# Patient Record
Sex: MALE | Race: WHITE | NOT HISPANIC OR LATINO | Employment: FULL TIME
[De-identification: names, ages, dates, MRNs, and addresses within clinical notes are randomized per-mention and may not be internally consistent; named-entity substitution may affect disease eponyms.]

---

## 2017-05-15 ENCOUNTER — RECORDS - HEALTHEAST (OUTPATIENT)
Dept: ADMINISTRATIVE | Facility: OTHER | Age: 37
End: 2017-05-15

## 2017-05-17 ENCOUNTER — RECORDS - HEALTHEAST (OUTPATIENT)
Dept: ADMINISTRATIVE | Facility: OTHER | Age: 37
End: 2017-05-17

## 2017-05-17 ENCOUNTER — COMMUNICATION - HEALTHEAST (OUTPATIENT)
Dept: INTERNAL MEDICINE | Facility: CLINIC | Age: 37
End: 2017-05-17

## 2017-05-17 ENCOUNTER — OFFICE VISIT - HEALTHEAST (OUTPATIENT)
Dept: INTERNAL MEDICINE | Facility: CLINIC | Age: 37
End: 2017-05-17

## 2017-05-17 DIAGNOSIS — R63.4 ABNORMAL WEIGHT LOSS: ICD-10-CM

## 2017-05-17 DIAGNOSIS — Z00.00 ROUTINE GENERAL MEDICAL EXAMINATION AT A HEALTH CARE FACILITY: ICD-10-CM

## 2017-05-17 DIAGNOSIS — G40.909 SEIZURE DISORDER (H): ICD-10-CM

## 2017-05-17 DIAGNOSIS — R07.89 RIGHT-SIDED CHEST WALL PAIN: ICD-10-CM

## 2017-05-17 DIAGNOSIS — R10.31 RIGHT INGUINAL PAIN: ICD-10-CM

## 2017-05-17 LAB
CHOLEST SERPL-MCNC: 216 MG/DL
FASTING STATUS PATIENT QL REPORTED: YES
HDLC SERPL-MCNC: 56 MG/DL
LDLC SERPL CALC-MCNC: 147 MG/DL
TRIGL SERPL-MCNC: 67 MG/DL

## 2017-05-17 ASSESSMENT — MIFFLIN-ST. JEOR: SCORE: 1617.26

## 2017-05-18 LAB — HIV 1+2 AB+HIV1 P24 AG SERPL QL IA: NEGATIVE

## 2017-05-19 ENCOUNTER — COMMUNICATION - HEALTHEAST (OUTPATIENT)
Dept: INTERNAL MEDICINE | Facility: CLINIC | Age: 37
End: 2017-05-19

## 2017-05-22 ENCOUNTER — COMMUNICATION - HEALTHEAST (OUTPATIENT)
Dept: INTERNAL MEDICINE | Facility: CLINIC | Age: 37
End: 2017-05-22

## 2017-06-12 ENCOUNTER — OFFICE VISIT - HEALTHEAST (OUTPATIENT)
Dept: INTERNAL MEDICINE | Facility: CLINIC | Age: 37
End: 2017-06-12

## 2017-06-12 DIAGNOSIS — F43.20 ADJUSTMENT DISORDER: ICD-10-CM

## 2017-06-12 DIAGNOSIS — R63.4 ABNORMAL WEIGHT LOSS: ICD-10-CM

## 2017-06-12 DIAGNOSIS — R07.89 CHEST WALL PAIN: ICD-10-CM

## 2017-06-12 ASSESSMENT — MIFFLIN-ST. JEOR: SCORE: 1617.26

## 2017-07-11 ENCOUNTER — OFFICE VISIT - HEALTHEAST (OUTPATIENT)
Dept: INTERNAL MEDICINE | Facility: CLINIC | Age: 37
End: 2017-07-11

## 2017-07-11 DIAGNOSIS — R07.89 CHEST WALL PAIN: ICD-10-CM

## 2017-07-11 DIAGNOSIS — R63.4 ABNORMAL WEIGHT LOSS: ICD-10-CM

## 2017-07-11 DIAGNOSIS — F43.22 ADJUSTMENT DISORDER WITH ANXIOUS MOOD: ICD-10-CM

## 2017-07-11 DIAGNOSIS — F41.9 ANXIETY: ICD-10-CM

## 2017-07-11 ASSESSMENT — MIFFLIN-ST. JEOR: SCORE: 1644.48

## 2017-07-18 ENCOUNTER — RECORDS - HEALTHEAST (OUTPATIENT)
Dept: ADMINISTRATIVE | Facility: OTHER | Age: 37
End: 2017-07-18

## 2017-07-28 ENCOUNTER — COMMUNICATION - HEALTHEAST (OUTPATIENT)
Dept: INTERNAL MEDICINE | Facility: CLINIC | Age: 37
End: 2017-07-28

## 2017-09-18 ENCOUNTER — OFFICE VISIT - HEALTHEAST (OUTPATIENT)
Dept: INTERNAL MEDICINE | Facility: CLINIC | Age: 37
End: 2017-09-18

## 2017-09-18 ENCOUNTER — RECORDS - HEALTHEAST (OUTPATIENT)
Dept: ADMINISTRATIVE | Facility: OTHER | Age: 37
End: 2017-09-18

## 2017-09-18 DIAGNOSIS — R63.4 ABNORMAL WEIGHT LOSS: ICD-10-CM

## 2017-09-18 DIAGNOSIS — Z23 NEED FOR IMMUNIZATION AGAINST INFLUENZA: ICD-10-CM

## 2017-09-18 ASSESSMENT — MIFFLIN-ST. JEOR: SCORE: 1667.16

## 2017-11-09 ENCOUNTER — RECORDS - HEALTHEAST (OUTPATIENT)
Dept: ADMINISTRATIVE | Facility: OTHER | Age: 37
End: 2017-11-09

## 2017-12-18 ENCOUNTER — OFFICE VISIT - HEALTHEAST (OUTPATIENT)
Dept: INTERNAL MEDICINE | Facility: CLINIC | Age: 37
End: 2017-12-18

## 2017-12-18 DIAGNOSIS — R63.4 ABNORMAL WEIGHT LOSS: ICD-10-CM

## 2017-12-18 DIAGNOSIS — F41.8 SITUATIONAL ANXIETY: ICD-10-CM

## 2017-12-18 DIAGNOSIS — R11.2 NAUSEA VOMITING AND DIARRHEA: ICD-10-CM

## 2017-12-18 DIAGNOSIS — R19.7 NAUSEA VOMITING AND DIARRHEA: ICD-10-CM

## 2017-12-18 ASSESSMENT — MIFFLIN-ST. JEOR: SCORE: 1680.77

## 2018-01-21 DIAGNOSIS — J31.0 OTHER CHRONIC RHINITIS: Primary | ICD-10-CM

## 2018-02-15 DIAGNOSIS — J31.0 OTHER CHRONIC RHINITIS: ICD-10-CM

## 2018-07-06 ENCOUNTER — COMMUNICATION - HEALTHEAST (OUTPATIENT)
Dept: INTERNAL MEDICINE | Facility: CLINIC | Age: 38
End: 2018-07-06

## 2018-11-29 ENCOUNTER — RECORDS - HEALTHEAST (OUTPATIENT)
Dept: ADMINISTRATIVE | Facility: OTHER | Age: 38
End: 2018-11-29

## 2018-11-29 ENCOUNTER — TRANSFERRED RECORDS (OUTPATIENT)
Dept: HEALTH INFORMATION MANAGEMENT | Facility: CLINIC | Age: 38
End: 2018-11-29

## 2019-05-06 ENCOUNTER — OFFICE VISIT - HEALTHEAST (OUTPATIENT)
Dept: INTERNAL MEDICINE | Facility: CLINIC | Age: 39
End: 2019-05-06

## 2019-05-06 DIAGNOSIS — G40.909 SEIZURE DISORDER (H): ICD-10-CM

## 2019-05-06 DIAGNOSIS — R25.3 EYE MUSCLE TWITCHES: ICD-10-CM

## 2019-05-06 DIAGNOSIS — F43.22 ADJUSTMENT DISORDER WITH ANXIOUS MOOD: ICD-10-CM

## 2019-05-06 DIAGNOSIS — R63.4 ABNORMAL WEIGHT LOSS: ICD-10-CM

## 2019-05-06 LAB
ANION GAP SERPL CALCULATED.3IONS-SCNC: 8 MMOL/L (ref 5–18)
BUN SERPL-MCNC: 10 MG/DL (ref 8–22)
CALCIUM SERPL-MCNC: 9.7 MG/DL (ref 8.5–10.5)
CHLORIDE BLD-SCNC: 101 MMOL/L (ref 98–107)
CO2 SERPL-SCNC: 26 MMOL/L (ref 22–31)
CREAT SERPL-MCNC: 0.85 MG/DL (ref 0.7–1.3)
GFR SERPL CREATININE-BSD FRML MDRD: >60 ML/MIN/1.73M2
GLUCOSE BLD-MCNC: 96 MG/DL (ref 70–125)
POTASSIUM BLD-SCNC: 4.6 MMOL/L (ref 3.5–5)
SODIUM SERPL-SCNC: 135 MMOL/L (ref 136–145)

## 2019-05-06 ASSESSMENT — MIFFLIN-ST. JEOR: SCORE: 1707.98

## 2019-05-07 ENCOUNTER — COMMUNICATION - HEALTHEAST (OUTPATIENT)
Dept: INTERNAL MEDICINE | Facility: CLINIC | Age: 39
End: 2019-05-07

## 2019-09-11 ENCOUNTER — COMMUNICATION - HEALTHEAST (OUTPATIENT)
Dept: INTERNAL MEDICINE | Facility: CLINIC | Age: 39
End: 2019-09-11

## 2019-09-11 ENCOUNTER — OFFICE VISIT - HEALTHEAST (OUTPATIENT)
Dept: INTERNAL MEDICINE | Facility: CLINIC | Age: 39
End: 2019-09-11

## 2019-09-11 ENCOUNTER — RECORDS - HEALTHEAST (OUTPATIENT)
Dept: ADMINISTRATIVE | Facility: OTHER | Age: 39
End: 2019-09-11

## 2019-09-11 DIAGNOSIS — L98.9 SKIN LESION: ICD-10-CM

## 2019-09-11 DIAGNOSIS — G40.909 SEIZURE DISORDER (H): ICD-10-CM

## 2019-09-11 DIAGNOSIS — Z00.00 ROUTINE GENERAL MEDICAL EXAMINATION AT A HEALTH CARE FACILITY: ICD-10-CM

## 2019-09-11 LAB
ALBUMIN SERPL-MCNC: 4.1 G/DL (ref 3.5–5)
ALP SERPL-CCNC: 86 U/L (ref 45–120)
ALT SERPL W P-5'-P-CCNC: 12 U/L (ref 0–45)
ANION GAP SERPL CALCULATED.3IONS-SCNC: 10 MMOL/L (ref 5–18)
AST SERPL W P-5'-P-CCNC: 16 U/L (ref 0–40)
BILIRUB SERPL-MCNC: 0.6 MG/DL (ref 0–1)
BUN SERPL-MCNC: 11 MG/DL (ref 8–22)
CALCIUM SERPL-MCNC: 9.4 MG/DL (ref 8.5–10.5)
CHLORIDE BLD-SCNC: 100 MMOL/L (ref 98–107)
CHOLEST SERPL-MCNC: 213 MG/DL
CO2 SERPL-SCNC: 27 MMOL/L (ref 22–31)
CREAT SERPL-MCNC: 0.9 MG/DL (ref 0.7–1.3)
ERYTHROCYTE [DISTWIDTH] IN BLOOD BY AUTOMATED COUNT: 11.5 % (ref 11–14.5)
FASTING STATUS PATIENT QL REPORTED: YES
GFR SERPL CREATININE-BSD FRML MDRD: >60 ML/MIN/1.73M2
GLUCOSE BLD-MCNC: 95 MG/DL (ref 70–125)
HCT VFR BLD AUTO: 50.3 % (ref 40–54)
HDLC SERPL-MCNC: 49 MG/DL
HGB BLD-MCNC: 16.8 G/DL (ref 14–18)
LDLC SERPL CALC-MCNC: 150 MG/DL
MCH RBC QN AUTO: 31.8 PG (ref 27–34)
MCHC RBC AUTO-ENTMCNC: 33.4 G/DL (ref 32–36)
MCV RBC AUTO: 95 FL (ref 80–100)
PLATELET # BLD AUTO: 353 THOU/UL (ref 140–440)
PMV BLD AUTO: 6.9 FL (ref 7–10)
POTASSIUM BLD-SCNC: 4.4 MMOL/L (ref 3.5–5)
PROT SERPL-MCNC: 7.3 G/DL (ref 6–8)
RBC # BLD AUTO: 5.28 MILL/UL (ref 4.4–6.2)
SODIUM SERPL-SCNC: 137 MMOL/L (ref 136–145)
TRIGL SERPL-MCNC: 72 MG/DL
WBC: 5.9 THOU/UL (ref 4–11)

## 2019-09-11 ASSESSMENT — MIFFLIN-ST. JEOR: SCORE: 1694.38

## 2019-10-02 ENCOUNTER — COMMUNICATION - HEALTHEAST (OUTPATIENT)
Dept: INTERNAL MEDICINE | Facility: CLINIC | Age: 39
End: 2019-10-02

## 2019-11-14 ENCOUNTER — TRANSFERRED RECORDS (OUTPATIENT)
Dept: HEALTH INFORMATION MANAGEMENT | Facility: CLINIC | Age: 39
End: 2019-11-14

## 2020-09-15 ENCOUNTER — COMMUNICATION - HEALTHEAST (OUTPATIENT)
Dept: INTERNAL MEDICINE | Facility: CLINIC | Age: 40
End: 2020-09-15

## 2020-09-15 ENCOUNTER — OFFICE VISIT - HEALTHEAST (OUTPATIENT)
Dept: INTERNAL MEDICINE | Facility: CLINIC | Age: 40
End: 2020-09-15

## 2020-09-15 ENCOUNTER — TRANSFERRED RECORDS (OUTPATIENT)
Dept: HEALTH INFORMATION MANAGEMENT | Facility: CLINIC | Age: 40
End: 2020-09-15

## 2020-09-15 DIAGNOSIS — L98.9 SKIN LESION: ICD-10-CM

## 2020-09-15 DIAGNOSIS — G40.909 SEIZURE DISORDER (H): ICD-10-CM

## 2020-09-15 DIAGNOSIS — Z00.00 ROUTINE GENERAL MEDICAL EXAMINATION AT A HEALTH CARE FACILITY: ICD-10-CM

## 2020-09-15 LAB
ALBUMIN SERPL-MCNC: 4.1 G/DL (ref 3.5–5)
ALBUMIN UR-MCNC: NEGATIVE MG/DL
ALP SERPL-CCNC: 80 U/L (ref 45–120)
ALT SERPL W P-5'-P-CCNC: 15 U/L (ref 0–45)
ALT SERPL-CCNC: 15 U/L (ref 0–45)
ANION GAP SERPL CALCULATED.3IONS-SCNC: 6 MMOL/L (ref 5–18)
APPEARANCE UR: CLEAR
AST SERPL W P-5'-P-CCNC: 18 U/L (ref 0–40)
AST SERPL-CCNC: 18 U/L (ref 0–40)
BILIRUB SERPL-MCNC: 0.5 MG/DL (ref 0–1)
BILIRUB UR QL STRIP: NEGATIVE
BUN SERPL-MCNC: 10 MG/DL (ref 8–22)
CALCIUM SERPL-MCNC: 9.7 MG/DL (ref 8.5–10.5)
CHLORIDE BLD-SCNC: 101 MMOL/L (ref 98–107)
CHOLEST SERPL-MCNC: 253 MG/DL
CHOLEST SERPL-MCNC: 253 MG/DL
CO2 SERPL-SCNC: 30 MMOL/L (ref 22–31)
COLOR UR AUTO: YELLOW
CREAT SERPL-MCNC: 0.89 MG/DL (ref 0.7–1.3)
CREAT SERPL-MCNC: 0.89 MG/DL (ref 0.7–1.3)
ERYTHROCYTE [DISTWIDTH] IN BLOOD BY AUTOMATED COUNT: 11.7 % (ref 11–14.5)
FASTING STATUS PATIENT QL REPORTED: YES
GFR SERPL CREATININE-BSD FRML MDRD: >60 ML/MIN/1.73M2
GFR SERPL CREATININE-BSD FRML MDRD: >60 ML/MIN/1.73M2
GLUCOSE BLD-MCNC: 86 MG/DL (ref 70–125)
GLUCOSE SERPL-MCNC: 86 MG/DL (ref 70–125)
GLUCOSE UR STRIP-MCNC: NEGATIVE MG/DL
HCT VFR BLD AUTO: 49.5 % (ref 40–54)
HDLC SERPL-MCNC: 54 MG/DL
HDLC SERPL-MCNC: 54 MG/DL
HGB BLD-MCNC: 16.6 G/DL (ref 14–18)
HGB UR QL STRIP: NEGATIVE
KETONES UR STRIP-MCNC: NEGATIVE MG/DL
LDLC SERPL CALC-MCNC: 187 MG/DL
LDLC SERPL CALC-MCNC: 187 MG/DL
LEUKOCYTE ESTERASE UR QL STRIP: NEGATIVE
LEVETIRACETAM (KEPPRA): 36.7 UG/ML (ref 6–46)
MCH RBC QN AUTO: 31.5 PG (ref 27–34)
MCHC RBC AUTO-ENTMCNC: 33.6 G/DL (ref 32–36)
MCV RBC AUTO: 94 FL (ref 80–100)
NITRATE UR QL: NEGATIVE
PH UR STRIP: 7 [PH] (ref 5–8)
PLATELET # BLD AUTO: 297 THOU/UL (ref 140–440)
PMV BLD AUTO: 6.7 FL (ref 7–10)
POTASSIUM BLD-SCNC: 4.9 MMOL/L (ref 3.5–5)
POTASSIUM SERPL-SCNC: 4.9 MMOL/L (ref 3.5–5)
PROT SERPL-MCNC: 6.9 G/DL (ref 6–8)
RBC # BLD AUTO: 5.28 MILL/UL (ref 4.4–6.2)
SODIUM SERPL-SCNC: 137 MMOL/L (ref 136–145)
SP GR UR STRIP: 1.01 (ref 1–1.03)
TRIGL SERPL-MCNC: 61 MG/DL
TRIGL SERPL-MCNC: 61 MG/DL
UROBILINOGEN UR STRIP-ACNC: NORMAL
WBC: 5.1 THOU/UL (ref 4–11)

## 2020-09-15 ASSESSMENT — MIFFLIN-ST. JEOR: SCORE: 1698.91

## 2020-10-21 ENCOUNTER — RECORDS - HEALTHEAST (OUTPATIENT)
Dept: ADMINISTRATIVE | Facility: OTHER | Age: 40
End: 2020-10-21

## 2020-10-21 ENCOUNTER — OFFICE VISIT (OUTPATIENT)
Dept: NEUROLOGY | Facility: CLINIC | Age: 40
End: 2020-10-21
Payer: COMMERCIAL

## 2020-10-21 VITALS
WEIGHT: 170 LBS | DIASTOLIC BLOOD PRESSURE: 87 MMHG | HEIGHT: 62 IN | SYSTOLIC BLOOD PRESSURE: 147 MMHG | BODY MASS INDEX: 31.28 KG/M2 | HEART RATE: 77 BPM

## 2020-10-21 DIAGNOSIS — G40.309 GENERALIZED CONVULSIVE EPILEPSY (H): Primary | ICD-10-CM

## 2020-10-21 DIAGNOSIS — M20.41 HAMMERTOES OF BOTH FEET: ICD-10-CM

## 2020-10-21 DIAGNOSIS — M20.42 HAMMERTOES OF BOTH FEET: ICD-10-CM

## 2020-10-21 PROCEDURE — 99205 OFFICE O/P NEW HI 60 MIN: CPT | Performed by: PSYCHIATRY & NEUROLOGY

## 2020-10-21 RX ORDER — LEVETIRACETAM 1000 MG/1
1000 TABLET, FILM COATED ORAL 2 TIMES DAILY
Qty: 180 TABLET | Refills: 3 | Status: SHIPPED | OUTPATIENT
Start: 2020-10-21 | End: 2021-11-09

## 2020-10-21 RX ORDER — LEVETIRACETAM 1000 MG/1
1000 TABLET, FILM COATED ORAL 2 TIMES DAILY
COMMUNITY
End: 2020-10-21

## 2020-10-21 ASSESSMENT — MIFFLIN-ST. JEOR: SCORE: 1555.36

## 2020-10-21 NOTE — NURSING NOTE
Chief Complaint   Patient presents with     Seizures     Needs new neurologist [facility] to [facility]- Needs DMV form      Jeny Gusman CMA on 10/21/2020 at 12:36 PM

## 2020-10-21 NOTE — NURSING NOTE
Component      Latest Ref Rng & Units 9/15/2020   Sodium      136 - 145 mmol/L 137   Potassium      3.5 - 5.0 mmol/L 4.9   Chloride      98 - 107 mmol/L 101   CO2      22 - 31 mmol/L 30   Anion Gap, Calculation      5 - 18 mmol/L 6   Glucose      70 - 125 mg/dL 86   BUN      8 - 22 mg/dL 10   Creatinine      0.70 - 1.30 mg/dL 0.89   GFR MDRD Af Amer      >60 mL/min/1.73m2 >60   GFR MDRD Non Af Amer      >60 mL/min/1.73m2 >60   Bilirubin, Total      0.0 - 1.0 mg/dL 0.5   Calcium      8.5 - 10.5 mg/dL 9.7   Protein, Total      6.0 - 8.0 g/dL 6.9   ALBUMIN      3.5 - 5.0 g/dL 4.1   Alkaline Phosphatase      45 - 120 U/L 80   AST      0 - 40 U/L 18   ALT      0 - 45 U/L 15   Color, UA      Colorless, Yellow, Straw, Light Yellow Yellow   Clarity, UA      Clear Clear   Glucose, UA      Negative Negative   Bilirubin, UA      Negative Negative   Ketones, UA      Negative Negative   Specific Gravity, UA      1.005 - 1.030 1.015   Blood, UA      Negative Negative   pH, UA      5.0 - 8.0 7.0   Protein, UA      Negative mg/dL Negative   Urobilinogen, UA      0.2 E.U./dL, 1.0 E.U./dL 0.2 E.U./dL   Nitrite, UA      Negative Negative   Leukocytes, UA      Negative Negative   WBC      4.0 - 11.0 thou/uL 5.1   RBC      4.40 - 6.20 mill/uL 5.28   Hemoglobin      14.0 - 18.0 g/dL 16.6   Hematocrit      40.0 - 54.0 % 49.5   MCV      80 - 100 fL 94   MCH      27.0 - 34.0 pg 31.5   MCHC      32.0 - 36.0 g/dL 33.6   RDW      11.0 - 14.5 % 11.7   Platelets      140 - 440 thou/uL 297   MPV      7.0 - 10.0 fL 6.7 (L)   Cholesterol      <=199 mg/dL 253 (H)   Triglycerides      <=149 mg/dL 61   HDL Cholesterol      >=40 mg/dL 54   LDL Calculated      <=129 mg/dL 187 (H)   Patient Fasting > 8hrs?       Yes   Levetiracetam      6.0 - 46.0 ug/mL 36.7

## 2020-10-21 NOTE — LETTER
10/21/2020         RE: Jean Paul Gregorio  [address] [address]        Dear Colleague,    Thank you for referring your patient, Jean Paul Gregorio, to the [facility] [place]. Please see a copy of my visit note below.    NEUROLOGY CONSULTATION NOTE       [facility] [place]  [address] [place] [address]  Tel: [phone]  Fax: [phone]  [email or web address]     Jean Paul Gregorio,  1980, MRN [id number]  PCP: Pato Cervantes, [phone]  Date: 10/21/2020     ASSESSMENT & PLAN     Diagnosis code: Generalized convulsive epilepsy (H)     Generalized epilepsy  Pleasant 40-year-old gentleman with history of generalized epilepsy that started at the age of 17.  Seizures were triggered by sleep deprivation and alcohol and occasionally he has noticed myoclonic twitches.  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  An attempt to taper him off Keppra 10 or 11 years ago resulted in breakthrough seizures.  I have recommended:    1.  Continue Keppra 1000 mg twice daily.  I refilled his prescription, gave him 90-day supply with 3 refills.  2.  He recently had Keppra level checked at his primary care physician office and was therapeutic at 36.7.  3.  Patient was asked to sign a release form so that we can get copies of his EEG done at [facility].  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  4.  Patient will drop off his DMV form to be filled out  5.  Follow-up will be in 1 year    Thank you again for this referral, please feel free to contact me if you have any questions.    Esequiel Lee MD  [facility]  (Former[facility], [facility]     REASON FOR CONSULTATION Seizures        HISTORY OF  PRESENT ILLNESS     We have been requested by Dr. Cervantes to evaluate Jean Paul Gregorio who is a 40 year old  male for seizures    Patient is a pleasant 40-year-old gentleman history of generalized epilepsy who was referred for evaluation and management of seizure.  According to patient his first seizure was when he was 17 years old.  He was in high school and stayed up late and had some alcohol and afterwards fell down and had a tonic-clonic seizure.  CT of the head at that time was done and was normal.  He was started on anticonvulsants and was doing fine until 10 or 11 years later when they were attempting to decrease the dose of Keppra.  While visiting [place] in front of his friend he had another seizure and her Keppra dose was increased back to 1000 mg twice daily.  Patient does report that sleep deprivation and alcohol intake does tend to increase his seizure risk.  He occasionally notices myoclonic twitches following alcohol consumption and sleep deprivation.  In addition to sleep deprivation, stress he has also noticed traveling tends to bring out his seizures.  Work-up in the past included EEG in October 2008 that reportedly was normal.  MRI brain in 1997 was also normal.  He recently had some lab work that included a therapeutic Keppra level.     PROBLEM LIST   Patient Active Problem List   Diagnosis Code     Generalized convulsive epilepsy (H) G40.309     Lumbar strain S39.012A     Hammertoes of both feet M20.41, M20.42         PAST MEDICAL & SURGICAL HISTORY     Past Medical History:   Patient  has a past medical history of Lyme disease.    Surgical History:  He  has a past surgical history that includes Septoplasty.     SOCIAL HISTORY     Reviewed, and he  reports that he has quit smoking. He has never used smokeless tobacco.     FAMILY HISTORY     Reviewed, and family history includes Hyperlipidemia in his father and mother; Hypertension in his mother.     ALLERGIES     Allergies   Allergen Reactions  "    Pollen Extract      Other reaction(s): Runny Nose         REVIEW OF SYSTEMS     A 12 point review of system was performed and was negative except as outlined in the history of present illness.     HOME MEDICATIONS       Current Outpatient Medications:      cetirizine HCl 10 MG CAPS, Take 1 capsule by mouth, Disp: , Rfl:      KEPPRA 1000 MG tablet, Take 1 tablet (1,000 mg) by mouth 2 times daily, Disp: 180 tablet, Rfl: 3     QVAR 80 MCG/ACT Inhaler, INHALE 1 PUFF TWICE DAILY, Disp: 8.7 g, Rfl: 0      PHYSICAL EXAM     Vital signs  BP (!) 147/87 (BP Location: Right arm, Patient Position: Sitting)   Pulse 77   Ht 1.575 m (5' 2\")   Wt 77.1 kg (170 lb)   BMI 31.09 kg/m      Weight:   170 lbs 0 oz    GENERAL PHYSICAL EXAM: Patient is alert and oriented x 4 in no acute distress. Neck was supple, no carotid bruits, thyromegaly, lymphadenopathy or JVD noted.   NEUROLOGICAL EXAM:  Mental Status  Patient is A&O x 4. Patient recalls 3/3 objects at 5 minutes.  Speech  Speech is clear and fluent with good repetition, comprehension, and naming both for objects and parts of an object. Written and verbal comprehension is intact.  Cranial Nerves  CN II: Visual fields are full to confrontation. Fundoscopic exam is normal with sharp discs and no vascular changes. Venous pulsations are present bilaterally. Pupils are equal and reactive to light.   CN III, IV, VI: EOMI, PERRLA  CN V: Facial sensation is intact to pinprick in all 3 divisions bilaterally. Corneal responses are intact.  CN VII: Face is symmetric with normal eye closure and smile.  CN VII: Hearing is normal to rubbing fingers  CN IX, X: Palate elevates symmetrically. Phonation is normal.  CN XI: Head turning and shoulder shrug are intact  CN XII: Tongue is midline with normal movements and no atrophy.  Motor Exam  Muscle bulk and tone are normal. No pronator drift. Strength is 5/5 bilaterally. No fasciculations noted.  Reflexes  Reflexes are 2+ and symmetric at the " biceps, triceps, knees, and ankles. Plantar responses are flexor.  Sensory Exam  Light touch, pinprick, position sense, and vibration sense are intact bilaterally. No astereognosia, agraphesthesia or extinction to bilateral simultaneous stimulation.  Coordination  Rapid alternating movements and fine finger movements are intact. No dysmetria on FNF and HKS. Romberg negative.  Gait  Posture is normal.Tandem gait is normal. Able to walk on toes and heels.     DIAGNOSTIC STUDIES     PERTINENT RADIOLOGY  Following imaging studies were reviewed:     Reviewed medical records from [facility] of neurology and patient had a EEG in October 2008 that was normal.  MRI of the brain at [facility] in November 1997 was normal.  He also had a CT of the brain in 1997 that was unremarkable.     PERTINENT LABS  Following labs were reviewed:  Component      Latest Ref Rng & Units 9/15/2020   Sodium      136 - 145 mmol/L 137   Potassium      3.5 - 5.0 mmol/L 4.9   Chloride      98 - 107 mmol/L 101   CO2      22 - 31 mmol/L 30   Anion Gap, Calculation      5 - 18 mmol/L 6   Glucose      70 - 125 mg/dL 86   BUN      8 - 22 mg/dL 10   Creatinine      0.70 - 1.30 mg/dL 0.89   GFR MDRD Af Amer      >60 mL/min/1.73m2 >60   GFR MDRD Non Af Amer      >60 mL/min/1.73m2 >60   Bilirubin, Total      0.0 - 1.0 mg/dL 0.5   Calcium      8.5 - 10.5 mg/dL 9.7   Protein, Total      6.0 - 8.0 g/dL 6.9   ALBUMIN      3.5 - 5.0 g/dL 4.1   Alkaline Phosphatase      45 - 120 U/L 80   AST      0 - 40 U/L 18   ALT      0 - 45 U/L 15   Color, UA      Colorless, Yellow, Straw, Light Yellow Yellow   Clarity, UA      Clear Clear   Glucose, UA      Negative Negative   Bilirubin, UA      Negative Negative   Ketones, UA      Negative Negative   Specific Gravity, UA      1.005 - 1.030 1.015   Blood, UA      Negative Negative   pH, UA      5.0 - 8.0 7.0   Protein, UA      Negative mg/dL Negative   Urobilinogen, UA      0.2 E.U./dL, 1.0 E.U./dL 0.2 E.U./dL    Nitrite, UA      Negative Negative   Leukocytes, UA      Negative Negative   WBC      4.0 - 11.0 thou/uL 5.1   RBC      4.40 - 6.20 mill/uL 5.28   Hemoglobin      14.0 - 18.0 g/dL 16.6   Hematocrit      40.0 - 54.0 % 49.5   MCV      80 - 100 fL 94   MCH      27.0 - 34.0 pg 31.5   MCHC      32.0 - 36.0 g/dL 33.6   RDW      11.0 - 14.5 % 11.7   Platelets      140 - 440 thou/uL 297   MPV      7.0 - 10.0 fL 6.7 (L)   Cholesterol      <=199 mg/dL 253 (H)   Triglycerides      <=149 mg/dL 61   HDL Cholesterol      >=40 mg/dL 54   LDL Calculated      <=129 mg/dL 187 (H)   Patient Fasting > 8hrs?       Yes   Levetiracetam      6.0 - 46.0 ug/mL 36.7                   Total time spent for face to face visit, reviewing labs/imaging studies, counseling and coordination of care was: 1 Hour 15 Minutes More than 50% of this time was spent on counseling and coordination of care.      This note was dictated using voice recognition software.  Any grammatical or context distortions are unintentional and inherent to the software.       Again, thank you for allowing me to participate in the care of your patient.        Sincerely,        Esequiel Lee MD

## 2020-10-21 NOTE — PROGRESS NOTES
NEUROLOGY CONSULTATION NOTE       [facility] [place]  [address] [place] [address]  Tel: [phone]  Fax: [phone]  [email or web address]     Jean Paul Gregorio,  1980, MRN [id number]  PCP: Pato Cervantes, [phone]  Date: 10/21/2020     ASSESSMENT & PLAN     Diagnosis code: Generalized convulsive epilepsy (H)     Generalized epilepsy  Pleasant 40-year-old gentleman with history of generalized epilepsy that started at the age of 17.  Seizures were triggered by sleep deprivation and alcohol and occasionally he has noticed myoclonic twitches.  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  An attempt to taper him off Keppra 10 or 11 years ago resulted in breakthrough seizures.  I have recommended:    1.  Continue Keppra 1000 mg twice daily.  I refilled his prescription, gave him 90-day supply with 3 refills.  2.  He recently had Keppra level checked at his primary care physician office and was therapeutic at 36.7.  3.  Patient was asked to sign a release form so that we can get copies of his EEG done at [facility].  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  4.  Patient will drop off his DMV form to be filled out  5.  Follow-up will be in 1 year    Thank you again for this referral, please feel free to contact me if you have any questions.    Esequiel Lee MD  [facility]  ([facility], [facility]     REASON FOR CONSULTATION Seizures        HISTORY OF PRESENT ILLNESS     We have been requested by Dr. Cervantes to evaluate Jean Paul Gregorio who is a 40 year old  male for seizures    Patient is a pleasant 40-year-old gentleman history of generalized epilepsy who was referred for evaluation and management of seizure.   According to patient his first seizure was when he was 17 years old.  He was in high school and stayed up late and had some alcohol and afterwards fell down and had a tonic-clonic seizure.  CT of the head at that time was done and was normal.  He was started on anticonvulsants and was doing fine until 10 or 11 years later when they were attempting to decrease the dose of Keppra.  While visiting [place] in front of his friend he had another seizure and her Keppra dose was increased back to 1000 mg twice daily.  Patient does report that sleep deprivation and alcohol intake does tend to increase his seizure risk.  He occasionally notices myoclonic twitches following alcohol consumption and sleep deprivation.  In addition to sleep deprivation, stress he has also noticed traveling tends to bring out his seizures.  Work-up in the past included EEG in October 2008 that reportedly was normal.  MRI brain in 1997 was also normal.  He recently had some lab work that included a therapeutic Keppra level.     PROBLEM LIST   Patient Active Problem List   Diagnosis Code     Generalized convulsive epilepsy (H) G40.309     Lumbar strain S39.012A     Hammertoes of both feet M20.41, M20.42         PAST MEDICAL & SURGICAL HISTORY     Past Medical History:   Patient  has a past medical history of Lyme disease.    Surgical History:  He  has a past surgical history that includes Septoplasty.     SOCIAL HISTORY     Reviewed, and he  reports that he has quit smoking. He has never used smokeless tobacco.     FAMILY HISTORY     Reviewed, and family history includes Hyperlipidemia in his father and mother; Hypertension in his mother.     ALLERGIES     Allergies   Allergen Reactions     Pollen Extract      Other reaction(s): Runny Nose         REVIEW OF SYSTEMS     A 12 point review of system was performed and was negative except as outlined in the history of present illness.     HOME MEDICATIONS       Current Outpatient Medications:       "cetirizine HCl 10 MG CAPS, Take 1 capsule by mouth, Disp: , Rfl:      KEPPRA 1000 MG tablet, Take 1 tablet (1,000 mg) by mouth 2 times daily, Disp: 180 tablet, Rfl: 3     QVAR 80 MCG/ACT Inhaler, INHALE 1 PUFF TWICE DAILY, Disp: 8.7 g, Rfl: 0      PHYSICAL EXAM     Vital signs  BP (!) 147/87 (BP Location: Right arm, Patient Position: Sitting)   Pulse 77   Ht 1.575 m (5' 2\")   Wt 77.1 kg (170 lb)   BMI 31.09 kg/m      Weight:   170 lbs 0 oz    GENERAL PHYSICAL EXAM: Patient is alert and oriented x 4 in no acute distress. Neck was supple, no carotid bruits, thyromegaly, lymphadenopathy or JVD noted.   NEUROLOGICAL EXAM:  Mental Status  Patient is A&O x 4. Patient recalls 3/3 objects at 5 minutes.  Speech  Speech is clear and fluent with good repetition, comprehension, and naming both for objects and parts of an object. Written and verbal comprehension is intact.  Cranial Nerves  CN II: Visual fields are full to confrontation. Fundoscopic exam is normal with sharp discs and no vascular changes. Venous pulsations are present bilaterally. Pupils are equal and reactive to light.   CN III, IV, VI: EOMI, PERRLA  CN V: Facial sensation is intact to pinprick in all 3 divisions bilaterally. Corneal responses are intact.  CN VII: Face is symmetric with normal eye closure and smile.  CN VII: Hearing is normal to rubbing fingers  CN IX, X: Palate elevates symmetrically. Phonation is normal.  CN XI: Head turning and shoulder shrug are intact  CN XII: Tongue is midline with normal movements and no atrophy.  Motor Exam  Muscle bulk and tone are normal. No pronator drift. Strength is 5/5 bilaterally. No fasciculations noted.  Reflexes  Reflexes are 2+ and symmetric at the biceps, triceps, knees, and ankles. Plantar responses are flexor.  Sensory Exam  Light touch, pinprick, position sense, and vibration sense are intact bilaterally. No astereognosia, agraphesthesia or extinction to bilateral simultaneous " stimulation.  Coordination  Rapid alternating movements and fine finger movements are intact. No dysmetria on FNF and HKS. Romberg negative.  Gait  Posture is normal.Tandem gait is normal. Able to walk on toes and heels.     DIAGNOSTIC STUDIES     PERTINENT RADIOLOGY  Following imaging studies were reviewed:     Reviewed medical records from [facility] of neurology and patient had a EEG in October 2008 that was normal.  MRI of the brain at [facility] in November 1997 was normal.  He also had a CT of the brain in 1997 that was unremarkable.     PERTINENT LABS  Following labs were reviewed:  Component      Latest Ref Rng & Units 9/15/2020   Sodium      136 - 145 mmol/L 137   Potassium      3.5 - 5.0 mmol/L 4.9   Chloride      98 - 107 mmol/L 101   CO2      22 - 31 mmol/L 30   Anion Gap, Calculation      5 - 18 mmol/L 6   Glucose      70 - 125 mg/dL 86   BUN      8 - 22 mg/dL 10   Creatinine      0.70 - 1.30 mg/dL 0.89   GFR MDRD Af Amer      >60 mL/min/1.73m2 >60   GFR MDRD Non Af Amer      >60 mL/min/1.73m2 >60   Bilirubin, Total      0.0 - 1.0 mg/dL 0.5   Calcium      8.5 - 10.5 mg/dL 9.7   Protein, Total      6.0 - 8.0 g/dL 6.9   ALBUMIN      3.5 - 5.0 g/dL 4.1   Alkaline Phosphatase      45 - 120 U/L 80   AST      0 - 40 U/L 18   ALT      0 - 45 U/L 15   Color, UA      Colorless, Yellow, Straw, Light Yellow Yellow   Clarity, UA      Clear Clear   Glucose, UA      Negative Negative   Bilirubin, UA      Negative Negative   Ketones, UA      Negative Negative   Specific Gravity, UA      1.005 - 1.030 1.015   Blood, UA      Negative Negative   pH, UA      5.0 - 8.0 7.0   Protein, UA      Negative mg/dL Negative   Urobilinogen, UA      0.2 E.U./dL, 1.0 E.U./dL 0.2 E.U./dL   Nitrite, UA      Negative Negative   Leukocytes, UA      Negative Negative   WBC      4.0 - 11.0 thou/uL 5.1   RBC      4.40 - 6.20 mill/uL 5.28   Hemoglobin      14.0 - 18.0 g/dL 16.6   Hematocrit      40.0 - 54.0 % 49.5   MCV       80 - 100 fL 94   MCH      27.0 - 34.0 pg 31.5   MCHC      32.0 - 36.0 g/dL 33.6   RDW      11.0 - 14.5 % 11.7   Platelets      140 - 440 thou/uL 297   MPV      7.0 - 10.0 fL 6.7 (L)   Cholesterol      <=199 mg/dL 253 (H)   Triglycerides      <=149 mg/dL 61   HDL Cholesterol      >=40 mg/dL 54   LDL Calculated      <=129 mg/dL 187 (H)   Patient Fasting > 8hrs?       Yes   Levetiracetam      6.0 - 46.0 ug/mL 36.7                   Total time spent for face to face visit, reviewing labs/imaging studies, counseling and coordination of care was: 1 Hour 15 Minutes More than 50% of this time was spent on counseling and coordination of care.      This note was dictated using voice recognition software.  Any grammatical or context distortions are unintentional and inherent to the software.

## 2020-11-25 ENCOUNTER — RECORDS - HEALTHEAST (OUTPATIENT)
Dept: ADMINISTRATIVE | Facility: OTHER | Age: 40
End: 2020-11-25

## 2021-02-20 ENCOUNTER — COMMUNICATION - HEALTHEAST (OUTPATIENT)
Dept: INTERNAL MEDICINE | Facility: CLINIC | Age: 41
End: 2021-02-20

## 2021-05-28 NOTE — PROGRESS NOTES
"  Office Visit - Follow Up   Jean Paul Gregorio   39 y.o. male    Date of Visit: 5/6/2019    Chief Complaint   Patient presents with     Follow-up     Weight     Eye Twitching     Right eye for 1 month        Assessment and Plan   1. Adjustment disorder with anxious mood  Resolved.  Doing well.    2. Abnormal weight loss  Resolved.    3. Seizure disorder (H)  He will see neurologist as planned on a yearly basis.  No seizures since I saw him last.    4. Eye muscle twitches  Likely stress related and I discussed this with him.  Check electrolytes.  I will see him back later in the year for physical.  He seems to be doing quite well at this time.  - Basic Metabolic Panel        Return in about 6 months (around 11/6/2019) for Annual physical.     History of Present Illness   This 39 y.o. old patient comes in today for follow-up.  He reports his been doing well.  Since I saw him last he has got a job.  He is working in [place] [employer].  That is going well.  He notes some intermittent right eye twitching with the muscle around his eye over the last month.  He has noted a little bit of stress at work with this but denies other neurologic symptoms should he does have a history of seizure disorder.  No other concerns.  Looking forward to spending time with his parents [facility].    Review of Systems: A comprehensive review of systems was negative except as noted.     Medications, Allergies and Problem List   Reviewed, reconciled and updated     Physical Exam   General Appearance:   Pleasant gentleman no distress weight is up considerably to 168 pounds.    /82 (Patient Site: Right Arm, Patient Position: Sitting)   Pulse 74   Ht 6' 0.5\" (1.842 m)   Wt 168 lb (76.2 kg)   SpO2 100%   BMI 22.47 kg/m           Additional Information   Current Outpatient Medications   Medication Sig Dispense Refill     KEPPRA 1,000 mg tablet Take 1 tablet by mouth 2 (two) times a day.  11     No current facility-administered medications for " this visit.      Allergies   Allergen Reactions     Tree Pollen-Birch, Standard      Social History     Tobacco Use     Smoking status: Former Smoker     Smokeless tobacco: Current User     Tobacco comment: Stopped 20 years ago   Substance Use Topics     Alcohol use: Yes     Comment: rare     Drug use: No       Review and/or order of clinical lab tests:  Review and/or order of radiology tests:  Review and/or order of medicine tests:  Discussion of test results with performing physician:  Decision to obtain old records and/or obtain history from someone other than the patient:  Review and summarization of old records and/or obtaining history from someone other than the patient and.or discussion of case with another health care provider:  Independent visualization of image, tracing or specimen itself:    Time: total time spent with the patient was 15 minutes of which >50% was spent in counseling and coordination of care     Pato Cervantes MD

## 2021-05-30 VITALS — HEIGHT: 73 IN | WEIGHT: 148 LBS | BODY MASS INDEX: 19.61 KG/M2

## 2021-05-31 VITALS — BODY MASS INDEX: 21.07 KG/M2 | HEIGHT: 73 IN | WEIGHT: 159 LBS

## 2021-05-31 VITALS — HEIGHT: 73 IN | BODY MASS INDEX: 20.41 KG/M2 | WEIGHT: 154 LBS

## 2021-05-31 VITALS — HEIGHT: 73 IN | BODY MASS INDEX: 19.61 KG/M2 | WEIGHT: 148 LBS

## 2021-05-31 VITALS — HEIGHT: 73 IN | WEIGHT: 162 LBS | BODY MASS INDEX: 21.47 KG/M2

## 2021-06-01 NOTE — PROGRESS NOTES
Office Visit - Physical   Jean Paul Gregorio   39 y.o.  male    Date of visit: 9/11/2019  Physician: Pato Cervantes MD     Assessment and Plan   1. Routine general medical examination at a health care facility  Flu shot given today.  Is otherwise up-to-date on his health maintenance and lives a healthy active lifestyle.  Continue same.  - Ambulatory referral to Dermatology  - Lipid Cascade  - Comprehensive Metabolic Panel  - HM2(CBC w/o Differential)    2. Seizure disorder (H)  Continue yearly follow-up with his neurologist.  Labs as below for med monitoring.  - Comprehensive Metabolic Panel  - HM2(CBC w/o Differential)    3. Skin lesion  We will have him meet with dermatology to further evaluate his irregular appearing nevus.  - Ambulatory referral to Dermatology        Return in about 1 year (around 9/11/2020) for Annual physical.     Chief Complaint   Chief Complaint   Patient presents with     Annual Exam     fasting today         Patient Profile   Social History     Social History Narrative     Not on file        Past Medical History   Patient Active Problem List   Diagnosis     Seizure disorder (H)       Past Surgical History  He has a past surgical history that includes Tympanostomy.     History of Present Illness   This 39 y.o. old Jean Paul comes today for follow-up.  He reports his been doing well.  His only issue is that he may be losing his job here at the end of the month.  Apparently it was only a temporary thing and they run out of funding for it.  He had considerable anxiety with weight loss and stress reaction here last year when he lost a job.  He is hoping that is not going to occur at this time.  Otherwise is feeling well.  He is currently not dating or sexually active.  He said no seizures.  He will be seeing a new neurologist this fall as his retired.  No other somatic concerns.  Is been trying to eat healthy.  Exercises regularly.  Enjoys time at his parents [place] place.  Family is all doing  "well.    Review of Systems: A comprehensive review of systems was negative except as noted.     Medications and Allergies   Current Outpatient Medications   Medication Sig Dispense Refill     fluticasone propionate (FLONASE) 50 mcg/actuation nasal spray Apply 1 spray into each nostril daily.       KEPPRA 1,000 mg tablet Take 1 tablet by mouth 2 (two) times a day.  11     No current facility-administered medications for this visit.      Allergies   Allergen Reactions     Tree Pollen-Birch, Standard         Family and Social History   Family History   Problem Relation Age of Onset     Hypertension Mother      Hyperlipidemia Father      No Medical Problems Brother      No Medical Problems Brother         Social History     Tobacco Use     Smoking status: Former Smoker     Smokeless tobacco: Current User     Tobacco comment: Stopped 20 years ago   Substance Use Topics     Alcohol use: Yes     Comment: rare     Drug use: No        Physical Exam   General Appearance:   Pleasant thin gentleman in no distress.  Good spirits.    /72 (Patient Site: Right Arm, Patient Position: Sitting, Cuff Size: Adult Regular)   Pulse 70   Ht 6' 0.5\" (1.842 m)   Wt 165 lb (74.8 kg)   SpO2 95%   BMI 22.07 kg/m      EYES: Eyelids, conjunctiva, and sclera were normal. Pupils were normal. Cornea, iris, and lens were normal bilaterally.  HEAD, EARS, NOSE, MOUTH, AND THROAT: Head and face were normal. Hearing was normal to voice and the ears were normal to external exam. Nose appearance was normal and there was no discharge. Oropharynx was normal.  NECK: Neck appearance was normal. There were no neck masses and the thyroid was not enlarged.  RESPIRATORY: Breathing pattern was normal and the chest moved symmetrically.  Percussion/auscultatory percussion was normal.  Lung sounds were normal and there were no abnormal sounds.  CARDIOVASCULAR: Heart rate and rhythm were normal.  S1 and S2 were normal and there were no extra sounds or " murmurs. Peripheral pulses in arms and legs were normal.  Jugular venous pressure was normal.  There was no peripheral edema.  GASTROINTESTINAL: The abdomen was normal in contour.  Bowel sounds were present.  Percussion detected no organ enlargement or tenderness.  Palpation detected no tenderness, mass, or enlarged organs.   MUSCULOSKELETAL: Skeletal configuration was normal and muscle mass was normal for age. Joint appearance was overall normal.  LYMPHATIC: There were no enlarged nodes.  SKIN/HAIR/NAILS: Skin color was normal.  Sebaceous cyst mid back.  Also a irregular nevus right back.  Hair and nails were normal.  NEUROLOGIC: The patient was alert and oriented to person, place, time, and circumstance. Speech was normal. Cranial nerves were normal. Motor strength was normal for age. The patient was normally coordinated.  PSYCHIATRIC:  Mood and affect were normal and the patient had normal recent and remote memory. The patient's judgment and insight were normal.    ADDITIONAL VITAL SIGNS: none  CHEST WALL/BREASTS: nml    RECTAL: def  GENITAL/URINARY: nml penis and testes bilat.       Additional Information        Pato Cervantes MD  Internal Medicine  Contact me at [phone]

## 2021-06-02 VITALS — WEIGHT: 168 LBS | BODY MASS INDEX: 22.26 KG/M2 | HEIGHT: 73 IN

## 2021-06-03 VITALS
HEIGHT: 73 IN | HEART RATE: 70 BPM | WEIGHT: 165 LBS | DIASTOLIC BLOOD PRESSURE: 72 MMHG | BODY MASS INDEX: 21.87 KG/M2 | SYSTOLIC BLOOD PRESSURE: 124 MMHG | OXYGEN SATURATION: 95 %

## 2021-06-04 VITALS
SYSTOLIC BLOOD PRESSURE: 122 MMHG | TEMPERATURE: 96.8 F | HEIGHT: 73 IN | HEART RATE: 70 BPM | OXYGEN SATURATION: 96 % | BODY MASS INDEX: 22 KG/M2 | DIASTOLIC BLOOD PRESSURE: 64 MMHG | WEIGHT: 166 LBS

## 2021-06-10 NOTE — PROGRESS NOTES
Office Visit - Physical   Jean Paul Gregorio   37 y.o.  male    Date of visit: 5/17/2017  Physician: Pato Cervantes MD     Assessment and Plan   1. Routine general medical examination at a health care facility  Patient lives a healthy lifestyle.  Continue same.  Labs as below.  Up-to-date on immunizations.  - Comprehensive Metabolic Panel  - HM2(CBC w/o Differential)  - Thyroid Stimulating Hormone (TSH)  - Urinalysis-UC if Indicated  - Levetiracetam [Keppra ]  - Lipid Cascade  - HIV Antigen/Antibody Screening Cascade    2. Abnormal weight loss  Likely a function of anxiety and mood due to stress with his previous job.  He feels better already.  Labs as below.  Return in 3 weeks and we will see how is doing and see if his weight has come up.  - Comprehensive Metabolic Panel  - HM2(CBC w/o Differential)  - Thyroid Stimulating Hormone (TSH)  - Urinalysis-UC if Indicated    3. Right-sided chest wall pain  Chest x-ray today but suspect muscular due to recent activity.  - XR Chest PA and Lateral    4. Seizure disorder  Fortunately no seizures despite his lack of sleep recently.  Labs as below.  Continue follow with his neurologist per  - Comprehensive Metabolic Panel  - HM2(CBC w/o Differential)  - Levetiracetam [Keppra ]    5. Right inguinal pain  Nothing on exam today.  No symptoms at this time.  Let me know if recurrent some more problems.        Return in about 3 weeks (around 6/7/2017) for Recheck.     Chief Complaint   Chief Complaint   Patient presents with     Annual Exam     fasting        Patient Profile   Social History     Social History Narrative        Past Medical History   Patient Active Problem List   Diagnosis     Seizure disorder       Past Surgical History  He has a past surgical history that includes Tympanostomy.     History of Present Illness   This 37 y.o. old patient comes in today for full physical.  I have not seen him in a couple years.  He has multiple other things to discuss today as well.  He  notes that he has been developing some right chest wall pain.  He is concerned about that.  He thought it was muscular due to putting in the docket [place] however there is some pleuritic component to it so he was concerned.  He also noted some groin pain on the right while lifting something at the gym I believe recently but that went away and has had no further problems.  Biggest concern however is weight loss.  He has lost 20 pounds in the last couple years since I saw him.  He states a lot of this was done recently.  He was just let go from his job that he had for only 4 months.  It was not a very good fit for them and so he was not well.  He was not sleeping.  He was not eating as well.  He was trying to exercise for stress reduction.  He is now sleeping better and feels better now that he was let go because he just was not doing well in that situation.  He is concerned however that he could have cancer or something else that caused his weight loss.  He denies other somatic concerns at this time.    Review of Systems: A comprehensive review of systems was negative except as noted.     Medications and Allergies   Current Outpatient Prescriptions   Medication Sig Dispense Refill     ascorbic acid (ASCORBIC ACID WITH BERNABE HIPS) 500 MG tablet Take 500 mg by mouth daily.       fluticasone (FLONASE) 50 mcg/actuation nasal spray 1 spray into each nostril 2 (two) times a day.       levETIRAcetam (KEPPRA) 1000 MG tablet Take 1,000 mg by mouth 2 (two) times a day.       MULTIVITAMIN ORAL Take 1 capsule by mouth daily.       zinc 50 mg Tab Take 1 tablet by mouth daily.       No current facility-administered medications for this visit.      Allergies   Allergen Reactions     Tree Pollen-Birch, Standard         Family and Social History   Family History   Problem Relation Age of Onset     Hypertension Mother      Hyperlipidemia Father      No Medical Problems Brother      No Medical Problems Brother      "    Social History   Substance Use Topics     Smoking status: Former Smoker     Smokeless tobacco: None      Comment: Stopped 20 years ago     Alcohol use Yes      Comment: rare        Physical Exam   General Appearance:   Pleasant very thin gentleman who is alert in no distress.  Good spirits.  He does not appear unwell.    /60 (Patient Site: Right Arm, Patient Position: Sitting, Cuff Size: Adult Regular)  Pulse (!) 58  Ht 6' 0.5\" (1.842 m)  Wt 148 lb (67.1 kg)  BMI 19.8 kg/m2    EYES: Eyelids, conjunctiva, and sclera were normal. Pupils were normal. Cornea, iris, and lens were normal bilaterally.  HEAD, EARS, NOSE, MOUTH, AND THROAT: Head and face were normal. Hearing was normal to voice and the ears were normal to external exam. Nose appearance was normal and there was no discharge. Oropharynx was normal.  NECK: Neck appearance was normal. There were no neck masses and the thyroid was not enlarged.  RESPIRATORY: Breathing pattern was normal and the chest moved symmetrically.  Percussion/auscultatory percussion was normal.  Lung sounds were normal and there were no abnormal sounds.  CARDIOVASCULAR: Heart rate and rhythm were normal.  S1 and S2 were normal and there were no extra sounds or murmurs. Peripheral pulses in arms and legs were normal.  Jugular venous pressure was normal.  There was no peripheral edema.  GASTROINTESTINAL: The abdomen was normal in contour.  Bowel sounds were present.  Percussion detected no organ enlargement or tenderness.  Palpation detected no tenderness, mass, or enlarged organs.   MUSCULOSKELETAL: Skeletal configuration was normal and muscle mass was normal for age. Joint appearance was overall normal.  LYMPHATIC: There were no enlarged nodes.  SKIN/HAIR/NAILS: Skin color was normal.  There were no skin lesions.  Hair and nails were normal.  NEUROLOGIC: The patient was alert and oriented to person, place, time, and circumstance. Speech was normal. Cranial nerves were normal. " Motor strength was normal for age. The patient was normally coordinated.  PSYCHIATRIC:  Mood and affect were normal and the patient had normal recent and remote memory. The patient's judgment and insight were normal.    ADDITIONAL VITAL SIGNS: None  CHEST WALL/BREASTS: Normal chest wall with no point tenderness.  No tenderness to palpation.    RECTAL: Deferred   GENITAL/URINARY: Normal.  No inguinal hernia.  No testicular masses.       Additional Information        Pato Cervantes MD  Internal Medicine  Contact me at [phone]

## 2021-06-11 NOTE — PROGRESS NOTES
"  Office Visit - Follow Up   Jean Paul Gregorio   37 y.o. male    Date of Visit: 7/11/2017    Chief Complaint   Patient presents with     Weight Loss     4 weeks f/u         Assessment and Plan   1. Abnormal weight loss  Resolved he.  He is gaining weight back.  This is a function of his anxiety and adjustment disorder.  Improving.  I will see him back in 3 months per    2. Chest wall pain  Resolved.  Consistent with costochondritis.    3. Anxiety  Improved.  I will see him back in 3 months he will let me know if he has more difficulties in the meantime.    4. Adjustment disorder with anxious mood  As above.        Return in about 3 months (around 10/11/2017) for Recheck.     History of Present Illness   This 37 y.o. old patient comes in today for follow-up of his adjustment disorder and anxiety.  He was quite anxious after losing his job.  He was losing quite a bit of weight.  Is really feeling very poorly.  He is feeling better now.  He is gained some weight back.  He is exercising.  He is no longer having the chest wall pain that he was having before.  He is trying to find a job.  He does not know what to do.  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    Review of Systems: A comprehensive review of systems was negative except as noted.     Medications, Allergies and Problem List   Reviewed and updated     Physical Exam   General Appearance:   Pleasant gentleman.  He looks much better.  Good spirits today.  Weight is up.    /74 (Patient Site: Right Arm, Patient Position: Sitting, Cuff Size: Adult Small)  Pulse 76  Ht 6' 0.5\" (1.842 m)  Wt 154 lb (69.9 kg)  SpO2 99%  BMI 20.6 kg/m2         Additional Information   Current Outpatient Prescriptions   Medication Sig Dispense Refill     ascorbic acid (ASCORBIC ACID WITH BERNABE HIPS) 500 MG tablet Take 500 mg by mouth daily.       fluticasone (FLONASE) 50 mcg/actuation nasal spray 1 spray into each nostril 2 (two) " times a day.       KEPPRA 1,000 mg tablet Take 1 tablet by mouth 2 (two) times a day.  11     MULTIVITAMIN ORAL Take 1 capsule by mouth daily.       zinc 50 mg Tab Take 1 tablet by mouth daily.       No current facility-administered medications for this visit.      Allergies   Allergen Reactions     Tree Pollen-Birch, Standard      Social History   Substance Use Topics     Smoking status: Former Smoker     Smokeless tobacco: None      Comment: Stopped 20 years ago     Alcohol use Yes      Comment: rare       Review and/or order of clinical lab tests:  Review and/or order of radiology tests:  Review and/or order of medicine tests:  Discussion of test results with performing physician:  Decision to obtain old records and/or obtain history from someone other than the patient:  Review and summarization of old records and/or obtaining history from someone other than the patient and.or discussion of case with another health care provider:  Independent visualization of image, tracing or specimen itself:    Time: total time spent with the patient was 25 minutes of which >50% was spent in counseling and coordination of care     Pato Cervantes MD

## 2021-06-11 NOTE — PROGRESS NOTES
Office Visit - Physical   Jean Paul Gregorio   40 y.o.  male    Date of visit: 9/15/2020  Physician: Pato Cervantes MD     Assessment and Plan   1. Routine general medical examination at a health care facility  Patient is living a pretty active lifestyle.  I did recommend more exercise however.  I have urged a Mediterranean diet.  Flu shot today.  Labs as below.  - Ambulatory referral to Dermatology  - Lipid Cascade  - Comprehensive Metabolic Panel  - HM2(CBC w/o Differential)  - Urinalysis-UC if Indicated    2. Seizure disorder (H)  He will let me know if he needs a referral to a new neurologist.  - Lipid Dent  - Comprehensive Metabolic Panel  - HM2(CBC w/o Differential)  - Urinalysis-UC if Indicated  - Levetiracetam [Keppra ]    3. Skin lesion  He would like to establish care with a dermatologist.  We will get that set up.  - Ambulatory referral to Dermatology        Return in about 1 year (around 9/15/2021) for Annual physical.     Chief Complaint   Chief Complaint   Patient presents with     Annual Exam     fasting today - flu shot today         Patient Profile   Social History     Social History Narrative     Not on file        Past Medical History   Patient Active Problem List   Diagnosis     Seizure disorder (H)       Past Surgical History  He has a past surgical history that includes Tympanostomy.     History of Present Illness   This 40 y.o. old Jean Paul comes in today for physical.  Reports has been doing well.  Has a history of seizure disorder and needs a new neurologist.  He has a recommendation from a friend who is a neurologist so he will let me know if he needs a referral.  No seizures for years now.  Denies concerns for me.  Not exercising on a regular basis.  Trying to eat somewhat healthy.  Has been doing some smoking of meats and trying to do some cooking.  He spending some time at his parents [facility].  Now working at [employer].  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   "He is not dating at this time.  His mom got a dog.  He sees them on a regular basis.    Review of Systems: A comprehensive review of systems was negative except as noted.     Medications and Allergies   Current Outpatient Medications   Medication Sig Dispense Refill     cetirizine (ZYRTEC) 10 mg cap Take 1 capsule by mouth as needed.       fluticasone propionate (FLONASE) 50 mcg/actuation nasal spray Apply 1 spray into each nostril as needed.        KEPPRA 1,000 mg tablet Take 1 tablet by mouth 2 (two) times a day.  11     No current facility-administered medications for this visit.      Allergies   Allergen Reactions     Tree Pollen-White Birch (Betula Verrucosa),Std         Family and Social History   Family History   Problem Relation Age of Onset     Hypertension Mother      Hyperlipidemia Father      No Medical Problems Brother      No Medical Problems Brother         Social History     Tobacco Use     Smoking status: Former Smoker     Smokeless tobacco: Current User     Tobacco comment: Stopped 20 years ago   Substance Use Topics     Alcohol use: Yes     Comment: rare     Drug use: No        Physical Exam   General Appearance:   Pleasant thin gentleman in no distress.  Good spirits.    /64   Pulse 70   Temp 96.8  F (36  C)   Ht 6' 0.5\" (1.842 m)   Wt 166 lb (75.3 kg)   SpO2 96%   BMI 22.20 kg/m      EYES: Eyelids, conjunctiva, and sclera were normal. Pupils were normal. Cornea, iris, and lens were normal bilaterally.  HEAD, EARS, NOSE, MOUTH, AND THROAT: Head and face were normal. Hearing was normal to voice and the ears were normal to external exam.  Mask in place.    NECK: Neck appearance was normal. There were no neck masses and the thyroid was not enlarged.  RESPIRATORY: Breathing pattern was normal and the chest moved symmetrically.  Percussion/auscultatory percussion was normal.  Lung sounds were normal and there were no abnormal sounds.  CARDIOVASCULAR: Heart rate and rhythm were normal.  S1 " and S2 were normal and there were no extra sounds or murmurs. Peripheral pulses in arms and legs were normal.  Jugular venous pressure was normal.  There was no peripheral edema.  GASTROINTESTINAL: The abdomen was normal in contour.  Bowel sounds were present.  Percussion detected no organ enlargement or tenderness.  Palpation detected no tenderness, mass, or enlarged organs.   MUSCULOSKELETAL: Skeletal configuration was normal and muscle mass was normal for age. Joint appearance was overall normal.  LYMPHATIC: There were no enlarged nodes.  SKIN/HAIR/NAILS: Skin color was normal.  Fairly large sebaceous cyst non-inflamed upper back.  Hair and nails were normal.  NEUROLOGIC: The patient was alert and oriented to person, place, time, and circumstance. Speech was normal. Cranial nerves were normal. Motor strength was normal for age. The patient was normally coordinated.  PSYCHIATRIC:  Mood and affect were normal and the patient had normal recent and remote memory. The patient's judgment and insight were normal.    ADDITIONAL VITAL SIGNS: None  CHEST WALL/BREASTS: nml    RECTAL: def  GENITAL/URINARY: Normal penis and testes.     Additional Information        Pato Cervantes MD  Internal Medicine  Contact me at [phone]

## 2021-06-11 NOTE — PROGRESS NOTES
Office Visit - Follow Up   Jean Paul Gregorio   37 y.o. male    Date of Visit: 6/12/2017    Chief Complaint   Patient presents with     Weight Loss     3 weeks f/u - not fasting         Assessment and Plan   1. Abnormal weight loss  Lengthy discussion with patient.  He did not lose his weight in 3 weeks and then sure he is not to gain it back in 3 weeks.  He is doing quite a bit of exercising which is going to require quite a bit more calories.  I am less concerned about his cholesterol at this time we discussed lean proteins and ways to try to gain weight.  I would like him  to gain another 5-10 pounds.    2. Adjustment disorder  He does not think he needs medications at this point.  We will continue to monitor closely.  I will see him back in 3-4 weeks and see how he does.    3. Chest wall pain  This is better.  He will given another week and then resume his weights using 6 very small weights.  Let me know if things worsen again.    The following low BMI interventions were performed this visit: prescribed diet education, weight gain advised and special diet education    Return in about 4 weeks (around 7/10/2017) for Recheck.     History of Present Illness   This 37 y.o. old patient comes in today for follow-up.  He was seen a few weeks ago with abnormal weight loss.  Labs look good at that time.  He had been very stressed with job that was not a good fit and eventually got fired.  Is extremely anxious overall this.  He was not sleeping any was not eating.  Now he is not working he feels good he states his weight is been stable.  He is exercising 5 days a week.  He has been trying to increase his intake but also has been staring clear of higher cholesterol foods.  He is worried however.  He is continues to be somewhat anxious but denies depression symptoms.  He denies other somatic concerns.  His chest wall pain he states is resolved.  He is only note is that if he presses on his chest wall he can somewhat feel  "it.    Review of Systems: A comprehensive review of systems was negative except as noted.     Medications, Allergies and Problem List   Reviewed and updated     Physical Exam   General Appearance:   Pleasant somewhat anxious gentleman.  Further exam today is deferred.  Weight is stable from last visit.    /58 (Patient Site: Right Arm, Patient Position: Sitting, Cuff Size: Adult Small)  Pulse 72  Ht 6' 0.5\" (1.842 m)  Wt 148 lb (67.1 kg)  SpO2 98%  BMI 19.8 kg/m2         Additional Information   Current Outpatient Prescriptions   Medication Sig Dispense Refill     ascorbic acid (ASCORBIC ACID WITH BERNABE HIPS) 500 MG tablet Take 500 mg by mouth daily.       fluticasone (FLONASE) 50 mcg/actuation nasal spray 1 spray into each nostril 2 (two) times a day.       levETIRAcetam (KEPPRA) 500 MG tablet TK 2 TS PO BID  4     MULTIVITAMIN ORAL Take 1 capsule by mouth daily.       zinc 50 mg Tab Take 1 tablet by mouth daily.       No current facility-administered medications for this visit.      Allergies   Allergen Reactions     Tree Pollen-Birch, Standard      Social History   Substance Use Topics     Smoking status: Former Smoker     Smokeless tobacco: None      Comment: Stopped 20 years ago     Alcohol use Yes      Comment: rare       Review and/or order of clinical lab tests:  Review and/or order of radiology tests:  Review and/or order of medicine tests:  Discussion of test results with performing physician:  Decision to obtain old records and/or obtain history from someone other than the patient:  Review and summarization of old records and/or obtaining history from someone other than the patient and.or discussion of case with another health care provider:  Independent visualization of image, tracing or specimen itself:    Time: total time spent with the patient was 25 minutes of which >50% was spent in counseling and coordination of care     Pato Cervantes MD  "

## 2021-06-13 NOTE — PROGRESS NOTES
"  Office Visit - Follow Up   Jean Paul Gregorio   37 y.o. male    Date of Visit: 9/18/2017    Chief Complaint   Patient presents with     Weight Loss     3 mo. f/u - not fasting         Assessment and Plan   1. Abnormal weight loss  Resolved.  He is gaining weight back.  Hopefully he will get job that he likes and he can be worry free.  I will see him back in another 3 months to see how is doing.    2. Need for immunization against influenza    - Influenza, Seasonal Quad, Preservative Free 36+ Months        Return in about 3 months (around 12/18/2017) for Recheck.     History of Present Illness   This 37 y.o. old patient comes in today for follow-up of his abnormal weight loss and stress and anxiety.  He lost his job several months ago now.  Prior to that he was under a lot of stress at work and lost about 20 pounds.  He is now gained over 10 of that back and is feeling well.  He still looking for job.  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  Denies new somatic concerns for me.  Would like a flu shot today    Review of Systems: A comprehensive review of systems was negative except as noted.     Medications, Allergies and Problem List   Reviewed and updated     Physical Exam   General Appearance:   Pleasant gentleman.  Looks good.  Good spirits.    /78 (Patient Site: Right Arm, Patient Position: Sitting, Cuff Size: Adult Regular)  Pulse 80  Ht 6' 0.5\" (1.842 m)  Wt 159 lb (72.1 kg)  BMI 21.27 kg/m2         Additional Information   Current Outpatient Prescriptions   Medication Sig Dispense Refill     ascorbic acid (ASCORBIC ACID WITH BERNABE HIPS) 500 MG tablet Take 500 mg by mouth daily.       beclomethasone (QVAR) 40 mcg/actuation inhaler Inhale 1 puff 2 (two) times a day. Nasal spray       KEPPRA 1,000 mg tablet Take 1 tablet by mouth 2 (two) times a day.  11     MULTIVITAMIN ORAL Take 1 capsule by mouth daily.       zinc 50 mg Tab Take 1 tablet by mouth daily.       fluticasone (FLONASE) 50 " mcg/actuation nasal spray 1 spray into each nostril 2 (two) times a day.       No current facility-administered medications for this visit.      Allergies   Allergen Reactions     Tree Pollen-Birch, Standard      Social History   Substance Use Topics     Smoking status: Former Smoker     Smokeless tobacco: None      Comment: Stopped 20 years ago     Alcohol use Yes      Comment: rare       Review and/or order of clinical lab tests:  Review and/or order of radiology tests:  Review and/or order of medicine tests:  Discussion of test results with performing physician:  Decision to obtain old records and/or obtain history from someone other than the patient:  Review and summarization of old records and/or obtaining history from someone other than the patient and.or discussion of case with another health care provider:  Independent visualization of image, tracing or specimen itself:    Time: total time spent with the patient was 15 minutes of which >50% was spent in counseling and coordination of care     Pato Cervantes MD

## 2021-06-14 NOTE — PROGRESS NOTES
"  Office Visit - Follow Up   Jean Paul Gregorio   37 y.o. male    Date of Visit: 12/18/2017    Chief Complaint   Patient presents with     Weight Loss     3 mo f/u - not fasting      Diarrhea     believes he may have food posioning yesterday afteroon. Sxs: loose stools, vomitting.  No fever, etc.         Assessment and Plan   1. Nausea vomiting and diarrhea  Resolved.  Likely viral gastroenteritis.  We discussed the natural history.    2. Abnormal weight loss  Resolved.  He is getting back more weight even with the recent illness.    3. Situational anxiety  If he does not get a job soon he is going to be getting more anxious and he will follow-up with me if that is the case follow-up.  Otherwise I will see him back in 6 months.        Return in about 6 months (around 6/18/2018) for Recheck.     History of Present Illness   This 37 y.o. old Jean Paul comes in for follow-up.  He had 3 interviews last week.  He is hopeful he is going to be able to get a job soon.  He has been without a job now for quite some time.  He started to get anxious about that.  He has gained back the weight that he lost.  He lost considerable weight with some job stress earlier in the year.  Over the weekend he developed nausea vomiting diarrhea.  That was just yesterday.  Thought it may be food related.  Feels better today.  No fevers or chills.  No known sick contacts.   Review of Systems: A comprehensive review of systems was negative except as noted.     Medications, Allergies and Problem List   Reviewed and updated     Physical Exam   General Appearance:   On physical exam he looks well.  Is in good spirits.  Not acutely ill.        BP 96/60 (Patient Site: Right Arm, Patient Position: Sitting, Cuff Size: Adult Regular)  Pulse 66  Temp 97.7  F (36.5  C)  Ht 6' 0.5\" (1.842 m)  Wt 162 lb (73.5 kg)  BMI 21.67 kg/m2         Additional Information   Current Outpatient Prescriptions   Medication Sig Dispense Refill     beclomethasone (QVAR) 40 " mcg/actuation inhaler Inhale 1 puff 2 (two) times a day. Nasal spray       KEPPRA 1,000 mg tablet Take 1 tablet by mouth 2 (two) times a day.  11     MULTIVITAMIN ORAL Take 1 capsule by mouth daily.       zinc 50 mg Tab Take 1 tablet by mouth daily.       No current facility-administered medications for this visit.      Allergies   Allergen Reactions     Tree Pollen-Birch, Standard      Social History   Substance Use Topics     Smoking status: Former Smoker     Smokeless tobacco: None      Comment: Stopped 20 years ago     Alcohol use Yes      Comment: rare       Review and/or order of clinical lab tests:  Review and/or order of radiology tests:  Review and/or order of medicine tests:  Discussion of test results with performing physician:  Decision to obtain old records and/or obtain history from someone other than the patient:  Review and summarization of old records and/or obtaining history from someone other than the patient and.or discussion of case with another health care provider:  Independent visualization of image, tracing or specimen itself:    Time: total time spent with the patient was 15 minutes of which >50% was spent in counseling and coordination of care     Pato Cervantes MD

## 2021-06-19 NOTE — LETTER
Letter by Pato Cervantes MD at      Author: Pato Cervantes MD Service: -- Author Type: --    Filed:  Encounter Date: 9/11/2019 Status: (Other)         Jean Paul Gregorio  [address] [facility] [address]             September 11, 2019         Dear Mr. Gregorio,    Below are the results from your recent visit:    Resulted Orders   Lipid Cascade   Result Value Ref Range    Cholesterol 213 (H) <=199 mg/dL    Triglycerides 72 <=149 mg/dL    HDL Cholesterol 49 >=40 mg/dL    LDL Calculated 150 (H) <=129 mg/dL    Patient Fasting > 8hrs? Yes    Comprehensive Metabolic Panel   Result Value Ref Range    Sodium 137 136 - 145 mmol/L    Potassium 4.4 3.5 - 5.0 mmol/L    Chloride 100 98 - 107 mmol/L    CO2 27 22 - 31 mmol/L    Anion Gap, Calculation 10 5 - 18 mmol/L    Glucose 95 70 - 125 mg/dL    BUN 11 8 - 22 mg/dL    Creatinine 0.90 0.70 - 1.30 mg/dL    GFR MDRD Af Amer >60 >60 mL/min/1.73m2    GFR MDRD Non Af Amer >60 >60 mL/min/1.73m2    Bilirubin, Total 0.6 0.0 - 1.0 mg/dL    Calcium 9.4 8.5 - 10.5 mg/dL    Protein, Total 7.3 6.0 - 8.0 g/dL    Albumin 4.1 3.5 - 5.0 g/dL    Alkaline Phosphatase 86 45 - 120 U/L    AST 16 0 - 40 U/L    ALT 12 0 - 45 U/L    Narrative    Fasting Glucose reference range is 70-99 mg/dL per  American Diabetes Association (ADA) guidelines.   HM2(CBC w/o Differential)   Result Value Ref Range    WBC 5.9 4.0 - 11.0 thou/uL    RBC 5.28 4.40 - 6.20 mill/uL    Hemoglobin 16.8 14.0 - 18.0 g/dL    Hematocrit 50.3 40.0 - 54.0 %    MCV 95 80 - 100 fL    MCH 31.8 27.0 - 34.0 pg    MCHC 33.4 32.0 - 36.0 g/dL    RDW 11.5 11.0 - 14.5 %    Platelets 353 140 - 440 thou/uL    MPV 6.9 (L) 7.0 - 10.0 fL       Labs all look good.  Cholesterol is up a little bit.  Stay active and try to follow a Mediterranean type diet.  We will recheck again next year.     Please call with questions or contact us using [email or web address].    Sincerely,        Electronically signed by Pato Cervantes MD

## 2021-06-19 NOTE — LETTER
Letter by Pato Cervantes MD at      Author: Pato Cervantes MD Service: -- Author Type: --    Filed:  Encounter Date: 5/7/2019 Status: (Other)         Jean Paul Gregorio  [address] [facility] [address]             May 7, 2019         Dear Mr. Gregorio,    Below are the results from your recent visit:    Resulted Orders   Basic Metabolic Panel   Result Value Ref Range    Sodium 135 (L) 136 - 145 mmol/L    Potassium 4.6 3.5 - 5.0 mmol/L    Chloride 101 98 - 107 mmol/L    CO2 26 22 - 31 mmol/L    Anion Gap, Calculation 8 5 - 18 mmol/L    Glucose 96 70 - 125 mg/dL    Calcium 9.7 8.5 - 10.5 mg/dL    BUN 10 8 - 22 mg/dL    Creatinine 0.85 0.70 - 1.30 mg/dL    GFR MDRD Af Amer >60 >60 mL/min/1.73m2    GFR MDRD Non Af Amer >60 >60 mL/min/1.73m2    Narrative    Fasting Glucose reference range is 70-99 mg/dL per  American Diabetes Association (ADA) guidelines.     No worrisome electrolyte abnormalities here.  Borderline low sodium likely a fluke and of no consequence.     Please call with questions or contact us using [email or web address].    Sincerely,        Electronically signed by Pato Cervantes MD

## 2021-06-19 NOTE — LETTER
Letter by Pato Cervantes MD at      Author: Pato Cervantes MD Service: -- Author Type: --    Filed:  Encounter Date: 10/2/2019 Status: Signed        [facility] INTERNAL MEDICINE  [address] [place] [address] [address]  [facility] [address]  [phone]         Jean Paul Gregorio  [address] [place] [id number]        10/02/19    Dear Jean Paul Gregorio,     At [facility] we care about your health and well-being. Your primary care provider is committed to ensuring you receive high quality care and has chosen a network of specialists to assist in providing that care. Recently Dr. Cervantes referred you to Dermatology for specialty care.      Please call [facility] [phone] at your earliest convenience for assistance in scheduling an appointment.  If you have already scheduled this appointment, please disregard this notice.  Thank you for choosing [facility] for your healthcare needs.       Sincerely,       [facility] Specialty Scheduling

## 2021-06-20 NOTE — LETTER
Letter by Pato Cervantes MD at      Author: Pato Cervantes MD Service: -- Author Type: --    Filed:  Encounter Date: 9/15/2020 Status: (Other)         Jean Paul Gregorio  [address] [facility] [address]             September 15, 2020         Dear Mr. Gregorio,    Below are the results from your recent visit:    Resulted Orders   Lipid Cascade   Result Value Ref Range    Cholesterol 253 (H) <=199 mg/dL    Triglycerides 61 <=149 mg/dL    HDL Cholesterol 54 >=40 mg/dL    LDL Calculated 187 (H) <=129 mg/dL    Patient Fasting > 8hrs? Yes    Comprehensive Metabolic Panel   Result Value Ref Range    Sodium 137 136 - 145 mmol/L    Potassium 4.9 3.5 - 5.0 mmol/L    Chloride 101 98 - 107 mmol/L    CO2 30 22 - 31 mmol/L    Anion Gap, Calculation 6 5 - 18 mmol/L    Glucose 86 70 - 125 mg/dL    BUN 10 8 - 22 mg/dL    Creatinine 0.89 0.70 - 1.30 mg/dL    GFR MDRD Af Amer >60 >60 mL/min/1.73m2    GFR MDRD Non Af Amer >60 >60 mL/min/1.73m2    Bilirubin, Total 0.5 0.0 - 1.0 mg/dL    Calcium 9.7 8.5 - 10.5 mg/dL    Protein, Total 6.9 6.0 - 8.0 g/dL    Albumin 4.1 3.5 - 5.0 g/dL    Alkaline Phosphatase 80 45 - 120 U/L    AST 18 0 - 40 U/L    ALT 15 0 - 45 U/L    Narrative    Fasting Glucose reference range is 70-99 mg/dL per  American Diabetes Association (ADA) guidelines.   HM2(CBC w/o Differential)   Result Value Ref Range    WBC 5.1 4.0 - 11.0 thou/uL    RBC 5.28 4.40 - 6.20 mill/uL    Hemoglobin 16.6 14.0 - 18.0 g/dL    Hematocrit 49.5 40.0 - 54.0 %    MCV 94 80 - 100 fL    MCH 31.5 27.0 - 34.0 pg    MCHC 33.6 32.0 - 36.0 g/dL    RDW 11.7 11.0 - 14.5 %    Platelets 297 140 - 440 thou/uL    MPV 6.7 (L) 7.0 - 10.0 fL   Urinalysis-UC if Indicated   Result Value Ref Range    Color, UA Yellow Colorless, Yellow, Straw, Light Yellow    Clarity, UA Clear Clear    Glucose, UA Negative Negative    Bilirubin, UA Negative Negative    Ketones, UA Negative Negative    Specific Gravity, UA 1.015 1.005 - 1.030    Blood, UA Negative  Negative    pH, UA 7.0 5.0 - 8.0    Protein, UA Negative Negative mg/dL    Urobilinogen, UA 0.2 E.U./dL 0.2 E.U./dL, 1.0 E.U./dL    Nitrite, UA Negative Negative    Leukocytes, UA Negative Negative    Narrative    Microscopic not indicated  UC not indicated   Levetiracetam [Keppra ]   Result Value Ref Range    Levetiracetam 36.7 6.0 - 46.0 ug/mL    Narrative    Suggested Trough Concentrations: 6.0 - 46.0 ug/mL       Jean Paul, your cholesterol is up quite a bit.  I need you to refocus your diet.  Less meat.  More vegetables and plant-based proteins.  We will send you some information regarding a Mediterranean diet.  Everything else here looks good.     Please call with questions or contact us using [employer].    Sincerely,        Electronically signed by Pato Cervantes MD

## 2021-09-21 ENCOUNTER — OFFICE VISIT (OUTPATIENT)
Dept: INTERNAL MEDICINE | Facility: CLINIC | Age: 41
End: 2021-09-21
Payer: COMMERCIAL

## 2021-09-21 VITALS
HEART RATE: 74 BPM | DIASTOLIC BLOOD PRESSURE: 78 MMHG | HEIGHT: 74 IN | OXYGEN SATURATION: 99 % | TEMPERATURE: 98.7 F | BODY MASS INDEX: 21.56 KG/M2 | SYSTOLIC BLOOD PRESSURE: 124 MMHG | WEIGHT: 168 LBS

## 2021-09-21 DIAGNOSIS — Z11.59 NEED FOR HEPATITIS C SCREENING TEST: ICD-10-CM

## 2021-09-21 DIAGNOSIS — E78.00 HYPERCHOLESTEROLEMIA: ICD-10-CM

## 2021-09-21 DIAGNOSIS — G40.309 GENERALIZED CONVULSIVE EPILEPSY (H): ICD-10-CM

## 2021-09-21 DIAGNOSIS — Z00.00 ROUTINE ADULT HEALTH MAINTENANCE: Primary | ICD-10-CM

## 2021-09-21 DIAGNOSIS — G47.00 INSOMNIA, UNSPECIFIED TYPE: ICD-10-CM

## 2021-09-21 LAB
ALBUMIN SERPL-MCNC: 4.1 G/DL (ref 3.5–5)
ALBUMIN UR-MCNC: NEGATIVE MG/DL
ALP SERPL-CCNC: 80 U/L (ref 45–120)
ALT SERPL W P-5'-P-CCNC: 14 U/L (ref 0–45)
ANION GAP SERPL CALCULATED.3IONS-SCNC: 7 MMOL/L (ref 5–18)
APPEARANCE UR: CLEAR
AST SERPL W P-5'-P-CCNC: 18 U/L (ref 0–40)
BACTERIA #/AREA URNS HPF: ABNORMAL /HPF
BILIRUB SERPL-MCNC: 0.9 MG/DL (ref 0–1)
BILIRUB UR QL STRIP: NEGATIVE
BUN SERPL-MCNC: 8 MG/DL (ref 8–22)
CALCIUM SERPL-MCNC: 9.9 MG/DL (ref 8.5–10.5)
CHLORIDE BLD-SCNC: 98 MMOL/L (ref 98–107)
CHOLEST SERPL-MCNC: 246 MG/DL
CO2 SERPL-SCNC: 30 MMOL/L (ref 22–31)
COLOR UR AUTO: YELLOW
CREAT SERPL-MCNC: 0.86 MG/DL (ref 0.7–1.3)
ERYTHROCYTE [DISTWIDTH] IN BLOOD BY AUTOMATED COUNT: 12.4 % (ref 10–15)
FASTING STATUS PATIENT QL REPORTED: NO
GFR SERPL CREATININE-BSD FRML MDRD: >90 ML/MIN/1.73M2
GLUCOSE BLD-MCNC: 89 MG/DL (ref 70–125)
GLUCOSE UR STRIP-MCNC: NEGATIVE MG/DL
HCT VFR BLD AUTO: 48.2 % (ref 40–53)
HDLC SERPL-MCNC: 47 MG/DL
HGB BLD-MCNC: 16.4 G/DL (ref 13.3–17.7)
HGB UR QL STRIP: ABNORMAL
KETONES UR STRIP-MCNC: ABNORMAL MG/DL
LDLC SERPL CALC-MCNC: 185 MG/DL
LEUKOCYTE ESTERASE UR QL STRIP: NEGATIVE
LEVETIRACETAM (KEPPRA): 39.9 UG/ML (ref 6–46)
MCH RBC QN AUTO: 30.8 PG (ref 26.5–33)
MCHC RBC AUTO-ENTMCNC: 34 G/DL (ref 31.5–36.5)
MCV RBC AUTO: 91 FL (ref 78–100)
NITRATE UR QL: NEGATIVE
PH UR STRIP: 7 [PH] (ref 5–8)
PLATELET # BLD AUTO: 296 10E3/UL (ref 150–450)
POTASSIUM BLD-SCNC: 4.8 MMOL/L (ref 3.5–5)
PROT SERPL-MCNC: 6.8 G/DL (ref 6–8)
RBC # BLD AUTO: 5.32 10E6/UL (ref 4.4–5.9)
RBC #/AREA URNS AUTO: ABNORMAL /HPF
SODIUM SERPL-SCNC: 135 MMOL/L (ref 136–145)
SP GR UR STRIP: 1.01 (ref 1–1.03)
SQUAMOUS #/AREA URNS AUTO: ABNORMAL /LPF
TRIGL SERPL-MCNC: 68 MG/DL
TSH SERPL DL<=0.005 MIU/L-ACNC: 0.61 UIU/ML (ref 0.3–5)
UROBILINOGEN UR STRIP-ACNC: 0.2 E.U./DL
WBC # BLD AUTO: 4.9 10E3/UL (ref 4–11)
WBC #/AREA URNS AUTO: ABNORMAL /HPF

## 2021-09-21 PROCEDURE — 85027 COMPLETE CBC AUTOMATED: CPT | Performed by: INTERNAL MEDICINE

## 2021-09-21 PROCEDURE — 80061 LIPID PANEL: CPT | Performed by: INTERNAL MEDICINE

## 2021-09-21 PROCEDURE — 36415 COLL VENOUS BLD VENIPUNCTURE: CPT | Performed by: INTERNAL MEDICINE

## 2021-09-21 PROCEDURE — 84443 ASSAY THYROID STIM HORMONE: CPT | Performed by: INTERNAL MEDICINE

## 2021-09-21 PROCEDURE — 80177 DRUG SCRN QUAN LEVETIRACETAM: CPT | Performed by: INTERNAL MEDICINE

## 2021-09-21 PROCEDURE — 80053 COMPREHEN METABOLIC PANEL: CPT | Performed by: INTERNAL MEDICINE

## 2021-09-21 PROCEDURE — 81001 URINALYSIS AUTO W/SCOPE: CPT | Performed by: INTERNAL MEDICINE

## 2021-09-21 PROCEDURE — 90471 IMMUNIZATION ADMIN: CPT | Performed by: INTERNAL MEDICINE

## 2021-09-21 PROCEDURE — 99396 PREV VISIT EST AGE 40-64: CPT | Mod: 25 | Performed by: INTERNAL MEDICINE

## 2021-09-21 PROCEDURE — 90686 IIV4 VACC NO PRSV 0.5 ML IM: CPT | Performed by: INTERNAL MEDICINE

## 2021-09-21 RX ORDER — LANOLIN ALCOHOL/MO/W.PET/CERES
1 CREAM (GRAM) TOPICAL AT BEDTIME
COMMUNITY

## 2021-09-21 ASSESSMENT — MIFFLIN-ST. JEOR: SCORE: 1723.85

## 2021-09-21 NOTE — PROGRESS NOTES
SUBJECTIVE:   CC: Jean Paul Gregorio is an 41 year old male who presents for preventative health visit.     Jean Paul comes in today for physical.  He is working hard on low-cholesterol diet.  He is trying to be very vigilant about a Mediterranean diet.  He exercises but not as much.  He is contemplating getting a home gym because he does not really feel comfortable in the gymnasium's.  He denies somatic concerns for me but he does note that prior to school starting he was having some issues with sleep.  Early morning awakening or inability to fall asleep.  That is going better now.  School is going good.  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  Parents are doing okay.  Father is slowing down.  Brothers are fine.  Patient has been advised of split billing requirements and indicates understanding: Yes  Healthy Habits:     Getting at least 3 servings of Calcium per day:  Yes    Bi-annual eye exam:  NO    Dental care twice a year:  Yes    Sleep apnea or symptoms of sleep apnea:  None    Diet:  Regular (no restrictions)    Frequency of exercise:  2-3 days/week    Duration of exercise:  15-30 minutes    Taking medications regularly:  No    Medication side effects:  Other    PHQ-2 Total Score: 0    Additional concerns today:  No        Today's PHQ-2 Score:   PHQ-2 ( 1999 Pf[employer]) 9/21/2021   Q1: Little interest or pleasure in doing things 0   Q2: Feeling down, depressed or hopeless 0   PHQ-2 Score 0   Q1: Little interest or pleasure in doing things Not at all   Q2: Feeling down, depressed or hopeless Not at all   PHQ-2 Score 0       Abuse: Current or Past(Physical, Sexual or Emotional)- No  Do you feel safe in your environment? Yes    Have you ever done Advance Care Planning? (For example, a Health Directive, POLST, or a discussion with a medical provider or your loved ones about your wishes): No, advance care planning information given to patient to review.  Patient plans to discuss their wishes with loved ones  "or provider.      Social History     Tobacco Use     Smoking status: Former Smoker     Smokeless tobacco: Never Used   Substance Use Topics     Alcohol use: Not on file     If you drink alcohol do you typically have >3 drinks per day or >7 drinks per week? No    Alcohol Use 9/21/2021   Prescreen: >3 drinks/day or >7 drinks/week? No       Last PSA: No results found for: PSA    Reviewed orders with patient. Reviewed health maintenance and updated orders accordingly - Yes      Reviewed and updated as needed this visit by clinical staff  Tobacco  Allergies  Meds              Reviewed and updated as needed this visit by Provider                    Review of Systems   ROS: 10 point ROS neg other than the symptoms noted above in the HPI.    OBJECTIVE:   /78 (BP Location: Left arm, Patient Position: Sitting, Cuff Size: Adult Small)   Pulse 74   Temp 98.7  F (37.1  C)   Ht 1.854 m (6' 1\")   Wt 76.2 kg (168 lb)   SpO2 99%   BMI 22.16 kg/m      Physical Exam  GENERAL: healthy, alert and no distress  EYES: Eyes grossly normal to inspection, PERRL and conjunctivae and sclerae normal  HENT: ear canals and TM's normal, nose and mouth without ulcers or lesions  NECK: no adenopathy, no asymmetry, masses, or scars and thyroid normal to palpation  RESP: lungs clear to auscultation - no rales, rhonchi or wheezes  CV: regular rate and rhythm, normal S1 S2, no S3 or S4, no murmur, click or rub, no peripheral edema and peripheral pulses strong  ABDOMEN: soft, nontender, no hepatosplenomegaly, no masses and bowel sounds normal   (male): normal male genitalia without lesions or urethral discharge, no hernia  MS: no gross musculoskeletal defects noted, no edema  SKIN: no suspicious lesions or rashes  NEURO: Normal strength and tone, mentation intact and speech normal  PSYCH: mentation appears normal, affect normal/bright    Diagnostic Test Results:  Labs reviewed in Epic    ASSESSMENT/PLAN:   1. Routine adult health " "maintenance  He lives an active and healthy lifestyle.  Flu shot today.  We discussed Covid booster today.  - Lipid panel reflex to direct LDL Fasting; Future  - TSH with free T4 reflex; Future  - UA Macro with Reflex to Micro and Culture - lab collect; Future  - Comprehensive metabolic panel (BMP + Alb, Alk Phos, ALT, AST, Total. Bili, TP); Future  - CBC with platelets; Future  - Keppra (Levetiracetam) Level; Future    2. Need for hepatitis C screening test  He is already had hep C screening as part of blood donation.    3. Generalized convulsive epilepsy (H)  He is now following with Dr. Lee.  Will check Keppra level.  - Keppra (Levetiracetam) Level; Future    4. Hypercholesterolemia  Continue with lifestyle modification only.  - Lipid panel reflex to direct LDL Fasting; Future  - TSH with free T4 reflex; Future    5. Insomnia, unspecified type  We discussed good sleep hygiene today.    Patient has been advised of split billing requirements and indicates understanding: Yes  COUNSELING:   Reviewed preventive health counseling, as reflected in patient instructions    Estimated body mass index is 22.16 kg/m  as calculated from the following:    Height as of this encounter: 1.854 m (6' 1\").    Weight as of this encounter: 76.2 kg (168 lb).         He reports that he has quit smoking. He has never used smokeless tobacco.      Counseling Resources:  ATP IV Guidelines  Pooled Cohorts Equation Calculator  FRAX Risk Assessment  ICSI Preventive Guidelines  Dietary Guidelines for Americans, 2010  USDA's MyPlate  ASA Prophylaxis  Lung CA Screening    ROSALIND CHAVES MD  [facility]  "

## 2021-09-21 NOTE — LETTER
September 22, 2021      Jean Paul Gregorio  [address] [facility] [address]        Dear ,    We are writing to inform you of your test results.    Jean Paul, your cholesterol is about where it was last year.  Like I said, please do not sweat it--this is genetic.  Some day you will need to take a medicine, I'm sure, but in the meantime, stay active and keep up with your Mediterranean diet.  Everything else here looks fine.         Resulted Orders   Lipid panel reflex to direct LDL Fasting   Result Value Ref Range    Cholesterol 246 (H) <=199 mg/dL    Triglycerides 68 <=149 mg/dL    Direct Measure HDL 47 >=40 mg/dL      Comment:      HDL Cholesterol Reference Range:     0-2 years:   No reference ranges established for patients under 2 years old  at [employer] for lipid analytes.    2-8 years:  Greater than 45 mg/dL     18 years and older:   Female: Greater than or equal to 50 mg/dL   Male:   Greater than or equal to 40 mg/dL    LDL Cholesterol Calculated 185 (H) <=129 mg/dL    Patient Fasting > 8hrs? No    TSH with free T4 reflex   Result Value Ref Range    TSH 0.61 0.30 - 5.00 uIU/mL   UA Macro with Reflex to Micro and Culture - lab collect   Result Value Ref Range    Color Urine Yellow Colorless, Straw, Light Yellow, Yellow    Appearance Urine Clear Clear    Glucose Urine Negative Negative mg/dL    Bilirubin Urine Negative Negative    Ketones Urine Trace (A) Negative mg/dL    Specific Gravity Urine 1.015 1.005 - 1.030    Blood Urine Trace (A) Negative    pH Urine 7.0 5.0 - 8.0    Protein Albumin Urine Negative Negative mg/dL    Urobilinogen Urine 0.2 0.2, 1.0 E.U./dL    Nitrite Urine Negative Negative    Leukocyte Esterase Urine Negative Negative   Comprehensive metabolic panel (BMP + Alb, Alk Phos, ALT, AST, Total. Bili, TP)   Result Value Ref Range    Sodium 135 (L) 136 - 145 mmol/L    Potassium 4.8 3.5 - 5.0 mmol/L    Chloride 98 98 - 107 mmol/L    Carbon Dioxide (CO2) 30 22 - 31  mmol/L    Anion Gap 7 5 - 18 mmol/L    Urea Nitrogen 8 8 - 22 mg/dL    Creatinine 0.86 0.70 - 1.30 mg/dL    Calcium 9.9 8.5 - 10.5 mg/dL    Glucose 89 70 - 125 mg/dL    Alkaline Phosphatase 80 45 - 120 U/L    AST 18 0 - 40 U/L    ALT 14 0 - 45 U/L    Protein Total 6.8 6.0 - 8.0 g/dL    Albumin 4.1 3.5 - 5.0 g/dL    Bilirubin Total 0.9 0.0 - 1.0 mg/dL    GFR Estimate >90 >60 mL/min/1.73m2      Comment:      As of July 11, 2021, eGFR is calculated by the CKD-EPI creatinine equation, without race adjustment. eGFR can be influenced by muscle mass, exercise, and diet. The reported eGFR is an estimation only and is only applicable if the renal function is stable.   CBC with platelets   Result Value Ref Range    WBC Count 4.9 4.0 - 11.0 10e3/uL    RBC Count 5.32 4.40 - 5.90 10e6/uL    Hemoglobin 16.4 13.3 - 17.7 g/dL    Hematocrit 48.2 40.0 - 53.0 %    MCV 91 78 - 100 fL    MCH 30.8 26.5 - 33.0 pg    MCHC 34.0 31.5 - 36.5 g/dL    RDW 12.4 10.0 - 15.0 %    Platelet Count 296 150 - 450 10e3/uL   Keppra (Levetiracetam) Level   Result Value Ref Range    Levetiracetam 39.9 6.0 - 46.0 ug/mL      Comment:      Suggested Trough Concentrations: 6.0 - 46.0 ug/mL   Urine Microscopic   Result Value Ref Range    Bacteria Urine None Seen None Seen /HPF    RBC Urine 0-2 0-2 /HPF /HPF    WBC Urine None Seen 0-5 /HPF /HPF    Squamous Epithelials Urine Few (A) None Seen /LPF    Narrative    Urine Culture not indicated       If you have any questions or concerns, please call the clinic at the number listed above.       Sincerely,      Pato Cervantes MD

## 2021-11-09 ENCOUNTER — OFFICE VISIT (OUTPATIENT)
Dept: NEUROLOGY | Facility: CLINIC | Age: 41
End: 2021-11-09
Payer: COMMERCIAL

## 2021-11-09 VITALS
BODY MASS INDEX: 22.53 KG/M2 | HEART RATE: 82 BPM | HEIGHT: 73 IN | WEIGHT: 170 LBS | SYSTOLIC BLOOD PRESSURE: 139 MMHG | DIASTOLIC BLOOD PRESSURE: 88 MMHG

## 2021-11-09 DIAGNOSIS — G40.309 GENERALIZED CONVULSIVE EPILEPSY (H): Primary | ICD-10-CM

## 2021-11-09 PROCEDURE — 99214 OFFICE O/P EST MOD 30 MIN: CPT | Performed by: PSYCHIATRY & NEUROLOGY

## 2021-11-09 RX ORDER — FLUTICASONE PROPIONATE 50 MCG
1 SPRAY, SUSPENSION (ML) NASAL DAILY
COMMUNITY

## 2021-11-09 RX ORDER — LEVETIRACETAM 1000 MG/1
1000 TABLET, FILM COATED ORAL 2 TIMES DAILY
Qty: 180 TABLET | Refills: 3 | Status: SHIPPED | OUTPATIENT
Start: 2021-11-09 | End: 2022-01-01

## 2021-11-09 ASSESSMENT — MIFFLIN-ST. JEOR: SCORE: 1724.99

## 2021-11-09 NOTE — LETTER
2021         RE: Jean Paul Gregorio  [address] [facility] [address]        Dear Colleague,    Thank you for referring your patient, Jean Paul Gregorio, to the [facility] NEUROLOGY [facility]. Please see a copy of my visit note below.    NEUROLOGY FOLLOW UP VISIT  NOTE       [facility] NEUROLOGY [place]  [address] [place] [address]  Tel: [phone]  Fax: [phone]  [email or web address]     Jean Paul Gregorio,  1980, MRN [id number]  PCP: Pato Cervantes  Date: 2021      ASSESSMENT & PLAN     Visit Diagnosis  1. Generalized convulsive epilepsy (H)     Generalized convulsive epilepsy  Pleasant 41-year-old gentleman with history of generalized convulsive epilepsy that started at age of 17.  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  He had an extensive work-up at [facility], [facility] including MRI multiple EEGs that were normal.  He was on Keppra and when an attempt was made to wean him off Keppra he had a breakthrough seizure and since then he has remained on Keppra with no recurrence.  He had Keppra level checked at his primary care physician's office recently and was therapeutic.  I have asked him to continue on Keppra 1000 mg twice daily.  He had his previous DMV form filled out at [facility], [facility] and will find out when it needs to be refilled by us.  Regular follow-up will be in 1 year    Thank you again for this referral, please feel free to contact me if you have any questions.    Esequiel Lee MD  [facility]  (Former[facility], [facility]     HISTORY OF PRESENT ILLNESS     Patient is a pleasant 41-year-old male with history of generalized epilepsy who returns for yearly follow-up.  He was last seen on  10/21/2020 when he reported that his seizures started when he was 17 years old.  He was in high school and stayed up late and had some alcohol and afterwards had a tonic-clonic seizure.  CT of the head at that time was normal.  He was started on some anticonvulsant and was doing fine until 10 or 11 years later when they were attempted to decrease the dose of Keppra and had another seizure.  His Keppra dose was increased back to 1000 mg twice daily.  He has occasionally experience some myoclonic twitches following alcohol consumption and sleep deprivation.  Work-up in the past included EEG that was normal MRI was also normal.  During his last visit my primary concern was juvenile myoclonic epilepsy but as noted above he had multiple EEGs and MRI at [facility], [facility] that were normal.  Since his last visit he reports no further seizures.  He was seen by Dr. Cervantes and had a Keppra level checked last month that was therapeutic.     PROBLEM LIST   Patient Active Problem List   Diagnosis Code     Generalized convulsive epilepsy (H) G40.309     Lumbar strain S39.012A     Hammertoes of both feet M20.41, M20.42     Hypercholesterolemia E78.00         PAST MEDICAL & SURGICAL HISTORY     Past Medical History:   Patient  has a past medical history of Allergic rhinitis, Chronic sinusitis, Lyme disease, Lyme disease, Nasal polyps, and Seizures (H).    Surgical History:  He  has a past surgical history that includes Septoplasty and Tympanostomy.     SOCIAL HISTORY     Reviewed, and he  reports that he has quit smoking. He has never used smokeless tobacco.     FAMILY HISTORY     Reviewed, and family history includes Hyperlipidemia in his father and mother; Hypertension in his mother; No Known Problems in his brother and brother.     ALLERGIES     Allergies   Allergen Reactions     Pollen Extract      Other reaction(s): Runny Nose         REVIEW OF SYSTEMS     A 12 point review of system was performed and was  "negative except as outlined in the history of present illness.     HOME MEDICATIONS     Current Outpatient Rx   Medication Sig Dispense Refill     cetirizine HCl 10 MG CAPS Take 1 capsule by mouth       fluticasone (FLONASE) 50 MCG/ACT nasal spray Spray 1 spray into both nostrils daily       KEPPRA 1000 MG tablet Take 1 tablet (1,000 mg) by mouth 2 times daily 180 tablet 3     melatonin 3 MG tablet Take 1 mg by mouth At Bedtime           PHYSICAL EXAM     Vital signs  /88 (BP Location: Right arm, Patient Position: Sitting)   Pulse 82   Ht 1.854 m (6' 1\")   Wt 77.1 kg (170 lb)   BMI 22.43 kg/m      Weight:   170 lbs 0 oz    Patient is alert and oriented x4 in no acute distress. Vital signs were reviewed and are documented in electronic medical record. Neck was supple, no carotid bruits, thyromegaly, JVD, or lymphadenopathy was noted.   NEUROLOGY EXAM:    Patient s speech was normal with no aphasia or dysarthria. Mentation, and affect were also normal.     Funduscopic exam was normal, with normal cup to disc ratio. Cranial nerves II -XII were intact.     Patient had normal mass, tone and motor strength was 5/5 in all extremities without pronator drift.     Sensation was intact to light touch, pinprick, and vibratory sensation.     Reflexes were 1+ symmetrical with downgoing toes.     No dysmetria noted on FNF or HKS. Romberg was negative.    Gait testing was normal. Able to walk on toes/heels. Tandem walk normal.     DIAGNOSTIC STUDIES     PERTINENT RADIOLOGY  Following imaging studies were reviewed:     MRI BRAIN 11/15/2008  1.  Normal MRI of the brain  2.  Pansinusitis    EEG 10/28/2008  Normal sleep deprived (awake) EEG    EMG 2/16/1998  This EMG of the right lower extremity with some sampling of the left side were within normal limits    EEG 1/25/2006  This is a normal recording for patient of this age.  No electroencephalographic correlate to patient's clinical history is noted.  Further clinical " correlation is recommended     PERTINENT LABS  Following labs were reviewed:  Office Visit on 09/21/2021   Component Date Value     Cholesterol 09/21/2021 246*     Triglycerides 09/21/2021 68      Direct Measure HDL 09/21/2021 47      LDL Cholesterol Calculat* 09/21/2021 185*     Patient Fasting > 8hrs? 09/21/2021 No      TSH 09/21/2021 0.61      Color Urine 09/21/2021 Yellow      Appearance Urine 09/21/2021 Clear      Glucose Urine 09/21/2021 Negative      Bilirubin Urine 09/21/2021 Negative      Ketones Urine 09/21/2021 Trace*     Specific Gravity Urine 09/21/2021 1.015      Blood Urine 09/21/2021 Trace*     pH Urine 09/21/2021 7.0      Protein Albumin Urine 09/21/2021 Negative      Urobilinogen Urine 09/21/2021 0.2      Nitrite Urine 09/21/2021 Negative      Leukocyte Esterase Urine 09/21/2021 Negative      Sodium 09/21/2021 135*     Potassium 09/21/2021 4.8      Chloride 09/21/2021 98      Carbon Dioxide (CO2) 09/21/2021 30      Anion Gap 09/21/2021 7      Urea Nitrogen 09/21/2021 8      Creatinine 09/21/2021 0.86      Calcium 09/21/2021 9.9      Glucose 09/21/2021 89      Alkaline Phosphatase 09/21/2021 80      AST 09/21/2021 18      ALT 09/21/2021 14      Protein Total 09/21/2021 6.8      Albumin 09/21/2021 4.1      Bilirubin Total 09/21/2021 0.9      GFR Estimate 09/21/2021 >90      WBC Count 09/21/2021 4.9      RBC Count 09/21/2021 5.32      Hemoglobin 09/21/2021 16.4      Hematocrit 09/21/2021 48.2      MCV 09/21/2021 91      MCH 09/21/2021 30.8      MCHC 09/21/2021 34.0      RDW 09/21/2021 12.4      Platelet Count 09/21/2021 296      Levetiracetam 09/21/2021 39.9      Bacteria Urine 09/21/2021 None Seen      RBC Urine 09/21/2021 0-2      WBC Urine 09/21/2021 None Seen      Squamous Epithelials Uri* 09/21/2021 Few*      Component      Latest Ref Rng & Units 9/15/2020 9/21/2021   Levetiracetam      6.0 - 46.0 ug/mL 36.7 39.9        Total time spent for face to face visit, reviewing labs/imaging studies,  counseling and coordination of care was: 30 Minutes spent on the date of the encounter doing chart review, review of outside records, review of test results, interpretation of tests, patient visit and documentation       This note was dictated using voice recognition software.  Any grammatical or context distortions are unintentional and inherent to the software.    No orders of the defined types were placed in this encounter.     New Prescriptions    No medications on file     Modified Medications    Modified Medication Previous Medication    KEPPRA 1000 MG TABLET KEPPRA 1000 MG tablet       Take 1 tablet (1,000 mg) by mouth 2 times daily    Take 1 tablet (1,000 mg) by mouth 2 times daily                     Again, thank you for allowing me to participate in the care of your patient.        Sincerely,        Esequiel Lee MD

## 2021-11-09 NOTE — PATIENT INSTRUCTIONS
Patient Education     Discharge Instructions for Epilepsy  You have been diagnosed with epilepsy, a disorder of recurring seizures. When you have a seizure, an electrical disturbance happens in your brain. There are different kinds of seizures, and each person may have one or many types of seizures. Here are some guidelines for you and your family.  If you have a seizure  Ask friends and family members to learn seizure management. Also, tell them to do the following if you have a seizure:    Clear the area to prevent injury.    Position you on a flat, carpeted surface, if possible.    Don t try to restrain you.    Don t put anything in your mouth.    Turn you onto your side if you start to vomit.    Keep track of the date and time the seizure started, how long it lasted, whether or not you lost consciousness, a description of your body movements, what provoked the seizure (if known), and any injuries you suffered. Using a watch may help keep correct time of events.      Stay with you until you regain consciousness.    Call 911 if the seizure is longer than 5 minutes, if there are multiple seizures, or if you don't start to wake up after the seizure stops.    Activities  Following are some things to consider:    Enjoy your normal activities. Most people with epilepsy lead normal lives.    Don't do hazardous activities, such as mountain climbing or scuba diving. A seizure under these conditions could lead to a fatal accident.    Don't swim alone or participate in other similar activities without others nearby.    Ask your healthcare provider about any restrictions on driving or other activities.    Check with your state department of public safety to learn whether there are any driving limitations based on your condition.  Other home care  Other considerations:    Take your medicine exactly as directed. Skipping doses can affect the way your body handles the medicine, which could cause you to have a  seizure.    Don t drink alcohol or use any medicine without talking with your healthcare provider first.    Seizure medicines may interact with other medicines. Make sure all of your healthcare providers have a list of all your medicines.     Birth control pills may not work as effectively when taking seizure medicines. Ask your healthcare provider if a change in birth control is needed.     Wear a medical alert pendant or bracelet that alerts others to your condition, especially if you are allergic to seizure medicine.    Join a local support group. Ask your healthcare provider for names and phone numbers.  Call 911  Tell your family members or friends to call 911 right away if you have:    Seizure that lasts more than 5 minutes    Multiple seizures in a row    No recovery of consciousness after the seizure stops  When to call your healthcare provider  Have family members or friends call your healthcare provider right away if you have:    Seizures that are getting longer and worse    Seizures that are different from those you ve had in the past    Seizures strong enough to cause injury    Skin rash    Fever of 100.4 F (38 C) or higher, or as directed by your healthcare provider  Leonila last reviewed this educational content on 4/1/2018 2000-2021 [employer]. All rights reserved. This information is not intended as a substitute for professional medical care. Always follow your healthcare professional's instructions.

## 2021-11-09 NOTE — NURSING NOTE
Chief Complaint   Patient presents with     Seizures     Annual follow up- doing well      Jeny Gusman CMA on 11/9/2021 at 11:11 AM

## 2021-11-09 NOTE — PROGRESS NOTES
NEUROLOGY FOLLOW UP VISIT  NOTE       [facility] NEUROLOGY [place]  [address] [place] [address]  Tel: [phone]  Fax: [phone]  [email or web address]     Jean Paul Gregorio,  1980, MRN [id number]  PCP: Pato Cervantes  Date: 2021      ASSESSMENT & PLAN     Visit Diagnosis  Generalized convulsive epilepsy (H)     Generalized convulsive epilepsy  Pleasant 41-year-old gentleman with history of generalized convulsive epilepsy that started at age of 17.  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  He had an extensive work-up at [facility], [facility] including MRI multiple EEGs that were normal.  He was on Keppra and when an attempt was made to wean him off Keppra he had a breakthrough seizure and since then he has remained on Keppra with no recurrence.  He had Keppra level checked at his primary care physician's office recently and was therapeutic.  I have asked him to continue on Keppra 1000 mg twice daily.  He had his previous DMV form filled out at [facility], [facility] and will find out when it needs to be refilled by us.  Regular follow-up will be in 1 year    Thank you again for this referral, please feel free to contact me if you have any questions.    Esequiel Lee MD  [facility]  (Former[facility], [facility]     HISTORY OF PRESENT ILLNESS     Patient is a pleasant 41-year-old male with history of generalized epilepsy who returns for yearly follow-up.  He was last seen on 10/21/2020 when he reported that his seizures started when he was 17 years old.  He was in high school and stayed up late and had some alcohol and afterwards had a tonic-clonic seizure.  CT of the head at that time was normal.  He was started on some anticonvulsant and was  doing fine until 10 or 11 years later when they were attempted to decrease the dose of Keppra and had another seizure.  His Keppra dose was increased back to 1000 mg twice daily.  He has occasionally experience some myoclonic twitches following alcohol consumption and sleep deprivation.  Work-up in the past included EEG that was normal MRI was also normal.  During his last visit my primary concern was juvenile myoclonic epilepsy but as noted above he had multiple EEGs and MRI at [facility], [facility] that were normal.  Since his last visit he reports no further seizures.  He was seen by Dr. Cervantes and had a Keppra level checked last month that was therapeutic.     PROBLEM LIST   Patient Active Problem List   Diagnosis Code     Generalized convulsive epilepsy (H) G40.309     Lumbar strain S39.012A     Hammertoes of both feet M20.41, M20.42     Hypercholesterolemia E78.00         PAST MEDICAL & SURGICAL HISTORY     Past Medical History:   Patient  has a past medical history of Allergic rhinitis, Chronic sinusitis, Lyme disease, Lyme disease, Nasal polyps, and Seizures (H).    Surgical History:  He  has a past surgical history that includes Septoplasty and Tympanostomy.     SOCIAL HISTORY     Reviewed, and he  reports that he has quit smoking. He has never used smokeless tobacco.     FAMILY HISTORY     Reviewed, and family history includes Hyperlipidemia in his father and mother; Hypertension in his mother; No Known Problems in his brother and brother.     ALLERGIES     Allergies   Allergen Reactions     Pollen Extract      Other reaction(s): Runny Nose         REVIEW OF SYSTEMS     A 12 point review of system was performed and was negative except as outlined in the history of present illness.     HOME MEDICATIONS     Current Outpatient Rx   Medication Sig Dispense Refill     cetirizine HCl 10 MG CAPS Take 1 capsule by mouth       fluticasone (FLONASE) 50 MCG/ACT nasal spray Spray 1 spray into both  "nostrils daily       KEPPRA 1000 MG tablet Take 1 tablet (1,000 mg) by mouth 2 times daily 180 tablet 3     melatonin 3 MG tablet Take 1 mg by mouth At Bedtime           PHYSICAL EXAM     Vital signs  /88 (BP Location: Right arm, Patient Position: Sitting)   Pulse 82   Ht 1.854 m (6' 1\")   Wt 77.1 kg (170 lb)   BMI 22.43 kg/m      Weight:   170 lbs 0 oz    Patient is alert and oriented x4 in no acute distress. Vital signs were reviewed and are documented in electronic medical record. Neck was supple, no carotid bruits, thyromegaly, JVD, or lymphadenopathy was noted.   NEUROLOGY EXAM:    Patient s speech was normal with no aphasia or dysarthria. Mentation, and affect were also normal.     Funduscopic exam was normal, with normal cup to disc ratio. Cranial nerves II -XII were intact.     Patient had normal mass, tone and motor strength was 5/5 in all extremities without pronator drift.     Sensation was intact to light touch, pinprick, and vibratory sensation.     Reflexes were 1+ symmetrical with downgoing toes.     No dysmetria noted on FNF or HKS. Romberg was negative.    Gait testing was normal. Able to walk on toes/heels. Tandem walk normal.     DIAGNOSTIC STUDIES     PERTINENT RADIOLOGY  Following imaging studies were reviewed:     MRI BRAIN 11/15/2008  1.  Normal MRI of the brain  2.  Pansinusitis    EEG 10/28/2008  Normal sleep deprived (awake) EEG    EMG 2/16/1998  This EMG of the right lower extremity with some sampling of the left side were within normal limits    EEG 1/25/2006  This is a normal recording for patient of this age.  No electroencephalographic correlate to patient's clinical history is noted.  Further clinical correlation is recommended     PERTINENT LABS  Following labs were reviewed:  Office Visit on 09/21/2021   Component Date Value     Cholesterol 09/21/2021 246*     Triglycerides 09/21/2021 68      Direct Measure HDL 09/21/2021 47      LDL Cholesterol Calculat* 09/21/2021 185* "     Patient Fasting > 8hrs? 09/21/2021 No      TSH 09/21/2021 0.61      Color Urine 09/21/2021 Yellow      Appearance Urine 09/21/2021 Clear      Glucose Urine 09/21/2021 Negative      Bilirubin Urine 09/21/2021 Negative      Ketones Urine 09/21/2021 Trace*     Specific Gravity Urine 09/21/2021 1.015      Blood Urine 09/21/2021 Trace*     pH Urine 09/21/2021 7.0      Protein Albumin Urine 09/21/2021 Negative      Urobilinogen Urine 09/21/2021 0.2      Nitrite Urine 09/21/2021 Negative      Leukocyte Esterase Urine 09/21/2021 Negative      Sodium 09/21/2021 135*     Potassium 09/21/2021 4.8      Chloride 09/21/2021 98      Carbon Dioxide (CO2) 09/21/2021 30      Anion Gap 09/21/2021 7      Urea Nitrogen 09/21/2021 8      Creatinine 09/21/2021 0.86      Calcium 09/21/2021 9.9      Glucose 09/21/2021 89      Alkaline Phosphatase 09/21/2021 80      AST 09/21/2021 18      ALT 09/21/2021 14      Protein Total 09/21/2021 6.8      Albumin 09/21/2021 4.1      Bilirubin Total 09/21/2021 0.9      GFR Estimate 09/21/2021 >90      WBC Count 09/21/2021 4.9      RBC Count 09/21/2021 5.32      Hemoglobin 09/21/2021 16.4      Hematocrit 09/21/2021 48.2      MCV 09/21/2021 91      MCH 09/21/2021 30.8      MCHC 09/21/2021 34.0      RDW 09/21/2021 12.4      Platelet Count 09/21/2021 296      Levetiracetam 09/21/2021 39.9      Bacteria Urine 09/21/2021 None Seen      RBC Urine 09/21/2021 0-2      WBC Urine 09/21/2021 None Seen      Squamous Epithelials Uri* 09/21/2021 Few*      Component      Latest Ref Rng & Units 9/15/2020 9/21/2021   Levetiracetam      6.0 - 46.0 ug/mL 36.7 39.9        Total time spent for face to face visit, reviewing labs/imaging studies, counseling and coordination of care was: 30 Minutes spent on the date of the encounter doing chart review, review of outside records, review of test results, interpretation of tests, patient visit and documentation     This note was dictated using voice recognition software.  Any  grammatical or context distortions are unintentional and inherent to the software.    No orders of the defined types were placed in this encounter.     New Prescriptions    No medications on file     Modified Medications    Modified Medication Previous Medication    KEPPRA 1000 MG TABLET KEPPRA 1000 MG tablet       Take 1 tablet (1,000 mg) by mouth 2 times daily    Take 1 tablet (1,000 mg) by mouth 2 times daily

## 2022-01-01 ENCOUNTER — LAB (OUTPATIENT)
Dept: LAB | Facility: CLINIC | Age: 42
End: 2022-01-01
Payer: COMMERCIAL

## 2022-01-01 ENCOUNTER — TELEPHONE (OUTPATIENT)
Dept: INTERNAL MEDICINE | Facility: CLINIC | Age: 42
End: 2022-01-01

## 2022-01-01 ENCOUNTER — TELEPHONE (OUTPATIENT)
Dept: NEUROLOGY | Facility: CLINIC | Age: 42
End: 2022-01-01

## 2022-01-01 ENCOUNTER — TELEPHONE (OUTPATIENT)
Dept: NEUROLOGY | Facility: CLINIC | Age: 42
End: 2022-01-01
Payer: COMMERCIAL

## 2022-01-01 ENCOUNTER — OFFICE VISIT (OUTPATIENT)
Dept: INTERNAL MEDICINE | Facility: CLINIC | Age: 42
End: 2022-01-01
Payer: COMMERCIAL

## 2022-01-01 VITALS
HEART RATE: 74 BPM | BODY MASS INDEX: 21.91 KG/M2 | RESPIRATION RATE: 12 BRPM | WEIGHT: 165.3 LBS | TEMPERATURE: 97.1 F | DIASTOLIC BLOOD PRESSURE: 76 MMHG | OXYGEN SATURATION: 99 % | HEIGHT: 73 IN | SYSTOLIC BLOOD PRESSURE: 114 MMHG

## 2022-01-01 DIAGNOSIS — G40.309 GENERALIZED CONVULSIVE EPILEPSY (H): ICD-10-CM

## 2022-01-01 DIAGNOSIS — E78.00 HYPERCHOLESTEROLEMIA: ICD-10-CM

## 2022-01-01 DIAGNOSIS — E87.1 HYPONATREMIA: ICD-10-CM

## 2022-01-01 DIAGNOSIS — I73.00 RAYNAUD'S DISEASE WITHOUT GANGRENE: ICD-10-CM

## 2022-01-01 DIAGNOSIS — Z00.00 ROUTINE ADULT HEALTH MAINTENANCE: Primary | ICD-10-CM

## 2022-01-01 LAB
ALBUMIN SERPL BCG-MCNC: 4.6 G/DL (ref 3.5–5.2)
ALBUMIN UR-MCNC: NEGATIVE MG/DL
ALP SERPL-CCNC: 82 U/L (ref 40–129)
ALT SERPL W P-5'-P-CCNC: 11 U/L (ref 10–50)
ANION GAP SERPL CALCULATED.3IONS-SCNC: 9 MMOL/L (ref 7–15)
APPEARANCE UR: CLEAR
AST SERPL W P-5'-P-CCNC: 26 U/L (ref 10–50)
BILIRUB SERPL-MCNC: 0.7 MG/DL
BILIRUB UR QL STRIP: NEGATIVE
BUN SERPL-MCNC: 6.7 MG/DL (ref 6–20)
CALCIUM SERPL-MCNC: 9.6 MG/DL (ref 8.6–10)
CHLORIDE SERPL-SCNC: 95 MMOL/L (ref 98–107)
CHOLEST SERPL-MCNC: 246 MG/DL
COLOR UR AUTO: YELLOW
CREAT SERPL-MCNC: 0.85 MG/DL (ref 0.67–1.17)
DEPRECATED HCO3 PLAS-SCNC: 28 MMOL/L (ref 22–29)
ERYTHROCYTE [DISTWIDTH] IN BLOOD BY AUTOMATED COUNT: 12.5 % (ref 10–15)
GFR SERPL CREATININE-BSD FRML MDRD: >90 ML/MIN/1.73M2
GLUCOSE SERPL-MCNC: 98 MG/DL (ref 70–99)
GLUCOSE UR STRIP-MCNC: NEGATIVE MG/DL
HCT VFR BLD AUTO: 49 % (ref 40–53)
HDLC SERPL-MCNC: 48 MG/DL
HGB BLD-MCNC: 16.6 G/DL (ref 13.3–17.7)
HGB UR QL STRIP: NEGATIVE
KETONES UR STRIP-MCNC: NEGATIVE MG/DL
LDLC SERPL CALC-MCNC: 185 MG/DL
LEUKOCYTE ESTERASE UR QL STRIP: NEGATIVE
LEVETIRACETAM SERPL-MCNC: 37.2 ΜG/ML (ref 10–40)
MCH RBC QN AUTO: 30.8 PG (ref 26.5–33)
MCHC RBC AUTO-ENTMCNC: 33.9 G/DL (ref 31.5–36.5)
MCV RBC AUTO: 91 FL (ref 78–100)
NITRATE UR QL: NEGATIVE
NONHDLC SERPL-MCNC: 198 MG/DL
PH UR STRIP: 7 [PH] (ref 5–8)
PLATELET # BLD AUTO: 296 10E3/UL (ref 150–450)
POTASSIUM SERPL-SCNC: 4.5 MMOL/L (ref 3.4–5.3)
PROT SERPL-MCNC: 7.2 G/DL (ref 6.4–8.3)
RBC # BLD AUTO: 5.39 10E6/UL (ref 4.4–5.9)
SODIUM SERPL-SCNC: 132 MMOL/L (ref 136–145)
SODIUM SERPL-SCNC: 135 MMOL/L (ref 136–145)
SP GR UR STRIP: 1.01 (ref 1–1.03)
TRIGL SERPL-MCNC: 67 MG/DL
UROBILINOGEN UR STRIP-ACNC: 0.2 E.U./DL
WBC # BLD AUTO: 4.7 10E3/UL (ref 4–11)

## 2022-01-01 PROCEDURE — 80061 LIPID PANEL: CPT | Performed by: INTERNAL MEDICINE

## 2022-01-01 PROCEDURE — 80177 DRUG SCRN QUAN LEVETIRACETAM: CPT | Performed by: INTERNAL MEDICINE

## 2022-01-01 PROCEDURE — 84295 ASSAY OF SERUM SODIUM: CPT

## 2022-01-01 PROCEDURE — 90686 IIV4 VACC NO PRSV 0.5 ML IM: CPT | Performed by: INTERNAL MEDICINE

## 2022-01-01 PROCEDURE — 90471 IMMUNIZATION ADMIN: CPT | Performed by: INTERNAL MEDICINE

## 2022-01-01 PROCEDURE — 99214 OFFICE O/P EST MOD 30 MIN: CPT | Mod: 25 | Performed by: INTERNAL MEDICINE

## 2022-01-01 PROCEDURE — 0124A COVID-19,PF,PFIZER BOOSTER BIVALENT: CPT | Performed by: INTERNAL MEDICINE

## 2022-01-01 PROCEDURE — 99396 PREV VISIT EST AGE 40-64: CPT | Mod: 25 | Performed by: INTERNAL MEDICINE

## 2022-01-01 PROCEDURE — 80053 COMPREHEN METABOLIC PANEL: CPT | Performed by: INTERNAL MEDICINE

## 2022-01-01 PROCEDURE — 81003 URINALYSIS AUTO W/O SCOPE: CPT | Performed by: INTERNAL MEDICINE

## 2022-01-01 PROCEDURE — 36415 COLL VENOUS BLD VENIPUNCTURE: CPT

## 2022-01-01 PROCEDURE — 85027 COMPLETE CBC AUTOMATED: CPT | Performed by: INTERNAL MEDICINE

## 2022-01-01 PROCEDURE — 36415 COLL VENOUS BLD VENIPUNCTURE: CPT | Performed by: INTERNAL MEDICINE

## 2022-01-01 PROCEDURE — 91312 COVID-19,PF,PFIZER BOOSTER BIVALENT: CPT | Performed by: INTERNAL MEDICINE

## 2022-01-01 RX ORDER — LEVETIRACETAM 1000 MG/1
TABLET, FILM COATED ORAL
Qty: 180 TABLET | Refills: 0 | Status: SHIPPED | OUTPATIENT
Start: 2022-01-01 | End: 2023-01-01

## 2022-01-01 ASSESSMENT — ENCOUNTER SYMPTOMS
HEMATOCHEZIA: 0
DIARRHEA: 0
SORE THROAT: 0
CHILLS: 0
NAUSEA: 0
EYE PAIN: 0
DYSURIA: 0
MYALGIAS: 0
ARTHRALGIAS: 0
FREQUENCY: 0
HEADACHES: 1
WEAKNESS: 0
COUGH: 1
DIZZINESS: 0
FEVER: 0
JOINT SWELLING: 0
SHORTNESS OF BREATH: 0
ABDOMINAL PAIN: 0
NERVOUS/ANXIOUS: 0
HEARTBURN: 0
HEMATURIA: 0
PALPITATIONS: 0
PARESTHESIAS: 0

## 2022-05-20 NOTE — TELEPHONE ENCOUNTER
Prior Authorization Retail Medication Request    Medication/Dose: Keppra 1000mg  ICD code (if different than what is on RX):    Previously Tried and Failed:    Rationale:      Insurance Name:    Insurance ID:        Pharmacy Information (if different than what is on RX)  Name:    Phone:

## 2022-05-20 NOTE — TELEPHONE ENCOUNTER
[facility] Call Center    Phone Message    May a detailed message be left on voicemail: yes     Reason for Call: Other: Needs prior authorization for [facility] pharmacy for Kepra mediation.  Patient would like [facility] office to complete prior approval when they receive it from  [facility].  Patient asks you to call if any questions.       Action Taken: Message routed to:  [facility]    Travel Screening: Not Applicable

## 2022-05-25 NOTE — TELEPHONE ENCOUNTER
Prior Authorization Not Needed per Insurance    Medication: Keppra 1000mg  Insurance Company: [employer] - Phone [phone] Fax [phone]  Expected CoPay:      Pharmacy Filling the Rx: [id number] [place], [place] - [address] [place] [address]  Pharmacy Notified:    Patient Notified:      Central Prior Authorization Team   Phone: [phone]

## 2022-06-03 NOTE — TELEPHONE ENCOUNTER
Health Call Center    Phone Message    May a detailed message be left on voicemail: yes     Reason for Call: Form or Letter   Type or form/letter needing completion: Loss of consciousness and voluntary control form.    Provider: Esequiel Lee MD  Date form needed: ASAP  Once completed: [facility]   [facility] [id number]    Patient is needing this form to submit to the state to keep his license from being suspended due to seizures. Please call patient back with questions at #[phone]    Patient has reached out multiple times and hasn't received this document.       Action Taken: Message routed to:  Other: [facility]     Travel Screening: Not Applicable

## 2022-06-06 NOTE — TELEPHONE ENCOUNTER
Pt called to inquire the need for a PA for Keppra, as [facility] had informed him his ins will no longer cover it. On May 26 there is a response that a PA is not needed. Pt is frustrated. He cannot take generic, and asked if we could rerun the PA, or contact him to explain. [phone]

## 2022-06-06 NOTE — TELEPHONE ENCOUNTER
JEANNE for pt that this is the first I have heard about this. Asked that he send the form to us and to first fill out the top portion, and sign it. If he does not have the form, then stop in our office to fill one out.

## 2022-06-08 NOTE — TELEPHONE ENCOUNTER
I called Jean Paul and CLARISSE  According to the notes, no PA for Keppra MARNI is needed but his copay was $110. I am not sure if he still thinks a PA is needed or if he isn't aware that that could be his copay for brand name Keppra. I have asked him to call back to clarify  Jeny Gusman CMA on 6/8/2022 at 3:05 PM

## 2022-06-08 NOTE — TELEPHONE ENCOUNTER
I spoke with Jean Paul who informed me that this is for 7/1/22 whixh is why it is saying PA not needed. He is just concerned as he does not want to run out of medication which is understandable. I have placed a reminder in the chart to initiate that PA ASAP come 7/1/22 with high priority sent to the PA team  Jeny Gusman CMA on 6/8/2022 at 3:34 PM

## 2022-07-01 NOTE — TELEPHONE ENCOUNTER
Prior Authorization Retail Medication Request    Medication/Dose: Keppra  ICD code (if different than what is on RX):    Previously Tried and Failed:    Rationale:      Insurance Name:    Insurance ID:        Pharmacy Information (if different than what is on RX)  Name:    Phone:

## 2022-07-01 NOTE — TELEPHONE ENCOUNTER
[facility] Call [facility]    Phone Message    May a detailed message be left on voicemail: yes     Reason for Call: Medication Question or concern regarding medication   Prescription Clarification  Name of Medication: Keppra   Prescribing Provider:    What on the order needs clarification? Patient Insurance [employer] informed patient PA is needed for Keppra, patient asking care team start on his PA for more refills. Patient asking for a call back.      Action Taken: Other: [facility]    Travel Screening: Not Applicable

## 2022-07-05 NOTE — TELEPHONE ENCOUNTER
PA Initiation    Medication: Keppra MARNI 1000mg HIGH PRIORITY   Insurance Company: [employer] - Phone [phone] Fax [phone]  Pharmacy Filling the Rx: [id number] - [place], [place] - [address] [place] [address]  Filling Pharmacy Phone: [phone]  Filling Pharmacy Fax: [phone]  Start Date: 7/5/2022

## 2022-07-05 NOTE — TELEPHONE ENCOUNTER
Prior Authorization Approval    Authorization Effective Date: 7/5/2022  Authorization Expiration Date: 7/5/2023  Medication: Keppra MARNI 1000mg HIGH PRIORITY   Approved Dose/Quantity:   Reference #: [id number]   Insurance Company: [employer] - [employer] [phone] Fax [phone]  Which Pharmacy is filling the prescription (Not needed for infusion/clinic administered): [id number] - [place], [place] - [address] [place] [address]  Pharmacy Notified: Yes  Patient Notified: Yes

## 2022-09-22 NOTE — PROGRESS NOTES
SUBJECTIVE:   CC: Jean Paul is an 42 year old who presents for preventative health visit.     Jean Paul comes in today for his physical.  Reports has been doing well.  He continues to be bothered by Raynaud's symptoms in the winter.  Its not painful and does not wish to take medications for it at this time.  He has had some issues with getting documentation from the neurology clinic.  He is contemplating maybe making a change there.  He has had no seizures.  He has been seizure-free for some time and tolerates his Keppra without difficulty.  Denies other somatic concerns.  Work is going well for him.  It is very satisfying work for him.  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      Patient has been advised of split billing requirements and indicates understanding: Yes  Healthy Habits:     Getting at least 3 servings of Calcium per day:  Yes    Bi-annual eye exam:  NO    Dental care twice a year:  Yes    Sleep apnea or symptoms of sleep apnea:  None    Diet:  Regular (no restrictions)    Frequency of exercise:  2-3 days/week    Duration of exercise:  15-30 minutes    Taking medications regularly:  Yes    Medication side effects:  Lightheadedness and Other    PHQ-2 Total Score: 0    Additional concerns today:  No              Today's PHQ-2 Score:   PHQ-2 ( 1999 [employer]) 9/22/2022   Q1: Little interest or pleasure in doing things 0   Q2: Feeling down, depressed or hopeless 0   PHQ-2 Score 0   PHQ-2 Total Score (12-17 Years)- Positive if 3 or more points; Administer PHQ-A if positive -   Q1: Little interest or pleasure in doing things Not at all   Q2: Feeling down, depressed or hopeless Not at all   PHQ-2 Score 0       Abuse: Current or Past(Physical, Sexual or Emotional)- No  Do you feel safe in your environment? Yes        Social History     Tobacco Use     Smoking status: Former Smoker     Smokeless tobacco: Never Used   Substance Use Topics     Alcohol use: Not on file     If you drink alcohol do you typically  "have >3 drinks per day or >7 drinks per week? No    Alcohol Use 9/22/2022   Prescreen: >3 drinks/day or >7 drinks/week? No   Prescreen: >3 drinks/day or >7 drinks/week? -   No flowsheet data found.    Last PSA: No results found for: PSA    Reviewed orders with patient. Reviewed health maintenance and updated orders accordingly - Yes      Reviewed and updated as needed this visit by clinical staff   Tobacco  Allergies  Meds                Reviewed and updated as needed this visit by Provider                       Review of Systems   Constitutional: Negative for chills and fever.   HENT: Positive for congestion. Negative for ear pain, hearing loss and sore throat.    Eyes: Negative for pain and visual disturbance.   Respiratory: Positive for cough. Negative for shortness of breath.    Cardiovascular: Negative for chest pain, palpitations and peripheral edema.   Gastrointestinal: Negative for abdominal pain, diarrhea, heartburn, hematochezia and nausea.   Genitourinary: Negative for dysuria, frequency, genital sores, hematuria, impotence, penile discharge and urgency.   Musculoskeletal: Negative for arthralgias, joint swelling and myalgias.   Skin: Negative for rash.   Neurological: Positive for headaches. Negative for dizziness, weakness and paresthesias.   Psychiatric/Behavioral: Negative for mood changes. The patient is not nervous/anxious.          OBJECTIVE:   /76 (BP Location: Left arm, Patient Position: Sitting, Cuff Size: Adult Regular)   Pulse 74   Temp 97.1  F (36.2  C) (Tympanic)   Resp 12   Ht 1.854 m (6' 1\")   Wt 75 kg (165 lb 4.8 oz)   SpO2 99%   BMI 21.81 kg/m      Physical Exam  GENERAL: healthy, alert and no distress  EYES: Eyes grossly normal to inspection, PERRL and conjunctivae and sclerae normal  HENT: normal cephalic/atraumatic and ear canals and TM's normal  NECK: no adenopathy, no asymmetry, masses, or scars and thyroid normal to palpation  RESP: lungs clear to auscultation - no " rales, rhonchi or wheezes  CV: regular rate and rhythm, normal S1 S2, no S3 or S4, no murmur, click or rub, no peripheral edema and peripheral pulses strong  ABDOMEN: soft, nontender, no hepatosplenomegaly, no masses and bowel sounds normal  MS: no gross musculoskeletal defects noted, no edema  SKIN: no suspicious lesions or rashes  NEURO: Normal strength and tone, mentation intact and speech normal  PSYCH: mentation appears normal, affect normal/bright        ASSESSMENT/PLAN:   1. Routine adult health maintenance  He lives in fairly active and healthy lifestyle.  We discussed more regular exercise.  I urged and encouraged him to get back into the gym as I think it would be good for physical and mental health.  - Lipid panel reflex to direct LDL Fasting; Future  - Comprehensive metabolic panel (BMP + Alb, Alk Phos, ALT, AST, Total. Bili, TP); Future  - CBC with platelets; Future  - UA Macro with Reflex to Micro and Culture - lab collect; Future  - Keppra (Levetiracetam) Level; Future  - UA Macro with Reflex to Micro and Culture - lab collect  - Lipid panel reflex to direct LDL Fasting  - Comprehensive metabolic panel (BMP + Alb, Alk Phos, ALT, AST, Total. Bili, TP)  - CBC with platelets  - Keppra (Levetiracetam) Level    2. Hypercholesterolemia  Lipids today for monitoring.    3. Raynaud's disease without gangrene  We discussed adding a low-dose of medication and he declines.  He will let me know if he changes his mind    4. Generalized convulsive epilepsy (H)  Labs for med monitoring.  Continue with yearly follow-up with neurology.  - Comprehensive metabolic panel (BMP + Alb, Alk Phos, ALT, AST, Total. Bili, TP); Future  - CBC with platelets; Future  - Keppra (Levetiracetam) Level; Future  - Comprehensive metabolic panel (BMP + Alb, Alk Phos, ALT, AST, Total. Bili, TP)  - CBC with platelets  - Keppra (Levetiracetam) Level    Patient has been advised of split billing requirements and indicates understanding:  "Yes    COUNSELING:   Reviewed preventive health counseling, as reflected in patient instructions    Estimated body mass index is 21.81 kg/m  as calculated from the following:    Height as of this encounter: 1.854 m (6' 1\").    Weight as of this encounter: 75 kg (165 lb 4.8 oz).         He reports that he has quit smoking. He has never used smokeless tobacco.      Counseling Resources:  ATP IV Guidelines  Pooled Cohorts Equation Calculator  FRAX Risk Assessment  ICSI Preventive Guidelines  Dietary Guidelines for Americans, 2010  USDA's MyPlate  ASA Prophylaxis  Lung CA Screening    ROSALIND CHAVES MD  [facility]  "

## 2022-09-23 NOTE — TELEPHONE ENCOUNTER
Patient returned called. Message relayed. Patient has concerns regarding low sodium. Should patient be worried if levels have to be rechecked? Please follow up with patient regarding his concerns.

## 2022-09-23 NOTE — TELEPHONE ENCOUNTER
Attempted to contact patient to relay results below from Dr Cervantes. No answer. Left message to call clinic.

## 2022-09-23 NOTE — TELEPHONE ENCOUNTER
----- Message from Pato Cervantes MD sent at 9/23/2022  8:52 AM CDT -----  Team - please call patient with results.    Jean Paul, your cholesterol is the exact same as last year!  That never happens--usually the numbers are a little different.  Everything else here looks fine, although your sodium is a little low.  This may be a lab fluke.  Are you drinking a ton of water?  It also appears that keppra can rarely cause low sodium.  I want you to get your sodium level checked again in a month or so, to make sure it isn't going down further.  Please make a lab appt for this.

## 2022-09-23 NOTE — TELEPHONE ENCOUNTER
Spoke with pt. Informed him that Dr. Cervantes would like a recheck in 1 month and does not have a reason for checking more urgently. If patient does develop symptoms related to low sodium, pt should contact clinic for more urgent follow-up.     Pt was understanding of the information and will contact clinic if symptoms develop.    Pt does have a lab appointment for a repeat Na.

## 2022-11-18 NOTE — TELEPHONE ENCOUNTER
Health Call Center    Phone Message    May a detailed message be left on voicemail: yes     Reason for Call: Medication Question or concern regarding medication   Prescription Clarification  Name of Medication: KEPPRA 1000 MG tablet  Prescribing Provider: Esequiel Lee MD   Pharmacy: [facility] [facility] [id number] [place] [address]    What on the order needs clarification? Pt is calling because the pharmacy put in a refill request to Dr. Lee but they told pt to call too. Pt says he probably has a little less than a week left of medication. Pt is scheduled to f/u up with Dr. Lee but not until May. Please send Rx to the pharmacy.    Action Taken: Message routed to:  Other: [facility]    Travel Screening: Not Applicable

## 2022-11-21 NOTE — TELEPHONE ENCOUNTER
Signed Prescriptions:                        Disp   Refills    KEPPRA 1000 MG tablet                      180 ta*0        Sig: TAKE 1 TABLET BY MOUTH TWICE A DAY  Authorizing Provider: QUIN GALLAGHER  Ordering User: WISAM DORADO RN reviewed refill hx and lov note from 11/2021. 90 day betsy refill approved.     F/u scheduled 5/2023    Wisam Dorado RN, BSN  [facility]

## 2023-01-01 ENCOUNTER — TELEPHONE (OUTPATIENT)
Dept: NEUROLOGY | Facility: CLINIC | Age: 43
End: 2023-01-01
Payer: COMMERCIAL

## 2023-01-01 DIAGNOSIS — G40.309 GENERALIZED CONVULSIVE EPILEPSY (H): ICD-10-CM

## 2023-01-01 RX ORDER — LEVETIRACETAM 1000 MG/1
TABLET, FILM COATED ORAL
Qty: 180 TABLET | Refills: 0 | Status: SHIPPED | OUTPATIENT
Start: 2023-01-01

## 2023-02-13 NOTE — TELEPHONE ENCOUNTER
[facility] Call Center    Phone Message    May a detailed message be left on voicemail: yes     Reason for Call: Medication Refill Request    Has the patient contacted the pharmacy for the refill? Yes   Name of medication being requested: KEPPRA 1000 MG tablet  Provider who prescribed the medication:     Patient called states that he is running low in medication with 3 days left, patient asking to put a rush on his refill request please.     Action Taken: Other: [facility]    Travel Screening: Not Applicable

## 2023-02-13 NOTE — TELEPHONE ENCOUNTER
Refill request for: KEPPRA 1000 MG tablet  Directions: TAKE 1 TABLET BY MOUTH TWICE A DAY    LOV: 11/6/21  NOV: 5/2/23    90 day supply with 0 refills Medication T'd for review and signature  Jeny Gusman CMA on 2/13/2023 at 11:01 AM

## 2023-02-13 NOTE — TELEPHONE ENCOUNTER
Sent to pharmacy in rx auth that came through   Closing TE  Jeny Gusman CMA on 2/13/2023 at 11:00 AM